# Patient Record
Sex: MALE | Race: WHITE | NOT HISPANIC OR LATINO
[De-identification: names, ages, dates, MRNs, and addresses within clinical notes are randomized per-mention and may not be internally consistent; named-entity substitution may affect disease eponyms.]

---

## 2019-07-16 ENCOUNTER — RECORD ABSTRACTING (OUTPATIENT)
Age: 54
End: 2019-07-16

## 2019-07-16 DIAGNOSIS — Z82.49 FAMILY HISTORY OF ISCHEMIC HEART DISEASE AND OTHER DISEASES OF THE CIRCULATORY SYSTEM: ICD-10-CM

## 2019-07-16 DIAGNOSIS — Z98.890 OTHER SPECIFIED POSTPROCEDURAL STATES: ICD-10-CM

## 2019-07-16 DIAGNOSIS — Z87.440 PERSONAL HISTORY OF URINARY (TRACT) INFECTIONS: ICD-10-CM

## 2019-07-16 DIAGNOSIS — Z87.442 PERSONAL HISTORY OF URINARY CALCULI: ICD-10-CM

## 2019-08-15 ENCOUNTER — APPOINTMENT (OUTPATIENT)
Dept: FAMILY MEDICINE | Facility: CLINIC | Age: 54
End: 2019-08-15
Payer: COMMERCIAL

## 2019-08-15 VITALS
HEART RATE: 67 BPM | SYSTOLIC BLOOD PRESSURE: 140 MMHG | HEIGHT: 68 IN | BODY MASS INDEX: 25.76 KG/M2 | OXYGEN SATURATION: 96 % | DIASTOLIC BLOOD PRESSURE: 100 MMHG | WEIGHT: 170 LBS

## 2019-08-15 DIAGNOSIS — Z77.090 CONTACT WITH AND (SUSPECTED) EXPOSURE TO ASBESTOS: ICD-10-CM

## 2019-08-15 DIAGNOSIS — Z80.9 FAMILY HISTORY OF MALIGNANT NEOPLASM, UNSPECIFIED: ICD-10-CM

## 2019-08-15 DIAGNOSIS — Z82.5 FAMILY HISTORY OF ASTHMA AND OTHER CHRONIC LOWER RESPIRATORY DISEASES: ICD-10-CM

## 2019-08-15 PROCEDURE — 99213 OFFICE O/P EST LOW 20 MIN: CPT | Mod: 25

## 2019-08-15 PROCEDURE — 99396 PREV VISIT EST AGE 40-64: CPT | Mod: 25

## 2019-08-15 PROCEDURE — 36415 COLL VENOUS BLD VENIPUNCTURE: CPT

## 2019-08-15 NOTE — HEALTH RISK ASSESSMENT
[Patient reported colonoscopy was normal] : Patient reported colonoscopy was normal [0] : 2) Feeling down, depressed, or hopeless: Not at all (0) [[id number]] : 0 [ColonoscopyDate] : 04/14 [ColonoscopyComments] : Patient reported

## 2019-08-15 NOTE — HISTORY OF PRESENT ILLNESS
[de-identified] : Pt here for annual PE.\par [place]eds [place]  license.  "".\par Had a cold in the spring.  The skin under the nose was raw.  But refused to heal since then.  Aggravates when he shaves it again.  Doesn't seem to heal.  More visible when dry.\par No exposures to asbestos as of 2009.  Not in air.\par \par

## 2019-08-19 LAB
ALBUMIN SERPL ELPH-MCNC: 4.7 G/DL
ALP BLD-CCNC: 59 U/L
ALT SERPL-CCNC: 16 U/L
ANION GAP SERPL CALC-SCNC: 12 MMOL/L
AST SERPL-CCNC: 15 U/L
BASOPHILS # BLD AUTO: 0.05 K/UL
BASOPHILS NFR BLD AUTO: 0.8 %
BILIRUB SERPL-MCNC: 0.4 MG/DL
BUN SERPL-MCNC: 12 MG/DL
CALCIUM SERPL-MCNC: 9.6 MG/DL
CHLORIDE SERPL-SCNC: 105 MMOL/L
CHOLEST SERPL-MCNC: 250 MG/DL
CHOLEST/HDLC SERPL: 5.1 RATIO
CO2 SERPL-SCNC: 24 MMOL/L
CREAT SERPL-MCNC: 0.81 MG/DL
EOSINOPHIL # BLD AUTO: 0.21 K/UL
EOSINOPHIL NFR BLD AUTO: 3.3 %
ESTIMATED AVERAGE GLUCOSE: 120 MG/DL
GLUCOSE SERPL-MCNC: 115 MG/DL
HBA1C MFR BLD HPLC: 5.8 %
HCT VFR BLD CALC: 48.4 %
HDLC SERPL-MCNC: 49 MG/DL
HGB BLD-MCNC: 15.2 G/DL
IMM GRANULOCYTES NFR BLD AUTO: 1.1 %
LDLC SERPL CALC-MCNC: 176 MG/DL
LYMPHOCYTES # BLD AUTO: 1.56 K/UL
LYMPHOCYTES NFR BLD AUTO: 24.3 %
MAN DIFF?: NORMAL
MCHC RBC-ENTMCNC: 30.1 PG
MCHC RBC-ENTMCNC: 31.4 GM/DL
MCV RBC AUTO: 95.8 FL
MONOCYTES # BLD AUTO: 0.43 K/UL
MONOCYTES NFR BLD AUTO: 6.7 %
NEUTROPHILS # BLD AUTO: 4.09 K/UL
NEUTROPHILS NFR BLD AUTO: 63.8 %
PLATELET # BLD AUTO: 206 K/UL
POTASSIUM SERPL-SCNC: 4.5 MMOL/L
PROT SERPL-MCNC: 7 G/DL
RBC # BLD: 5.05 M/UL
RBC # FLD: 14.1 %
SODIUM SERPL-SCNC: 141 MMOL/L
TRIGL SERPL-MCNC: 124 MG/DL
TSH SERPL-ACNC: 1.28 UIU/ML
WBC # FLD AUTO: 6.41 K/UL

## 2019-09-05 ENCOUNTER — TRANSCRIPTION ENCOUNTER (OUTPATIENT)
Age: 54
End: 2019-09-05

## 2019-10-21 ENCOUNTER — APPOINTMENT (OUTPATIENT)
Dept: FAMILY MEDICINE | Facility: CLINIC | Age: 54
End: 2019-10-21
Payer: COMMERCIAL

## 2019-10-21 VITALS
WEIGHT: 170 LBS | BODY MASS INDEX: 25.76 KG/M2 | HEIGHT: 68 IN | HEART RATE: 72 BPM | DIASTOLIC BLOOD PRESSURE: 88 MMHG | SYSTOLIC BLOOD PRESSURE: 128 MMHG

## 2019-10-21 DIAGNOSIS — Z20.2 CONTACT WITH AND (SUSPECTED) EXPOSURE TO INFECTIONS WITH A PREDOMINANTLY SEXUAL MODE OF TRANSMISSION: ICD-10-CM

## 2019-10-21 PROCEDURE — 99214 OFFICE O/P EST MOD 30 MIN: CPT | Mod: 25

## 2019-10-21 PROCEDURE — G0008: CPT

## 2019-10-21 PROCEDURE — 36415 COLL VENOUS BLD VENIPUNCTURE: CPT

## 2019-10-21 PROCEDURE — 90686 IIV4 VACC NO PRSV 0.5 ML IM: CPT

## 2019-10-22 LAB
HBV CORE IGG+IGM SER QL: NONREACTIVE
HBV SURFACE AB SER QL: NONREACTIVE
HBV SURFACE AG SER QL: NONREACTIVE
HCV AB SER QL: NONREACTIVE
HCV S/CO RATIO: 0.08 S/CO
HEPATITIS A IGG ANTIBODY: NONREACTIVE
T PALLIDUM AB SER QL IA: NEGATIVE

## 2019-10-22 NOTE — HISTORY OF PRESENT ILLNESS
[FreeTextEntry1] : BP follow up [de-identified] : Pt here for f/u.\par Bought BP monitor.  Has been testing at home.  120s/70s on over 20 measurements at home.  Feels well.\par Pt wants to do endoscopy.  Taking nexium.  Gets tremendous discomfort of GI tract when not taking medication.\par Worried about possibly having Hepatitis.  Had unprotected sex many years ago and was never tested.  Also wants to test for syphilis.\par Sees urology for elevated PSA.  Wonders whether we should draw PSA.  No new symptoms or concerns.\par Worried about his heart.  Male in his 50s.  No FH. Few other risk factors.  No classic chest pain but worries if GI symptoms might be cardiac.\par \par

## 2019-12-16 ENCOUNTER — CLINICAL ADVICE (OUTPATIENT)
Age: 54
End: 2019-12-16

## 2019-12-16 ENCOUNTER — TRANSCRIPTION ENCOUNTER (OUTPATIENT)
Age: 54
End: 2019-12-16

## 2020-01-02 ENCOUNTER — NON-APPOINTMENT (OUTPATIENT)
Age: 55
End: 2020-01-02

## 2020-01-02 ENCOUNTER — APPOINTMENT (OUTPATIENT)
Dept: CARDIOLOGY | Facility: CLINIC | Age: 55
End: 2020-01-02
Payer: COMMERCIAL

## 2020-01-02 VITALS
HEIGHT: 68 IN | OXYGEN SATURATION: 96 % | DIASTOLIC BLOOD PRESSURE: 90 MMHG | BODY MASS INDEX: 25.91 KG/M2 | SYSTOLIC BLOOD PRESSURE: 122 MMHG | WEIGHT: 171 LBS | HEART RATE: 76 BPM | TEMPERATURE: 98.4 F

## 2020-01-02 DIAGNOSIS — M94.0 CHONDROCOSTAL JUNCTION SYNDROME [TIETZE]: ICD-10-CM

## 2020-01-02 DIAGNOSIS — Z78.9 OTHER SPECIFIED HEALTH STATUS: ICD-10-CM

## 2020-01-02 PROCEDURE — 0298T: CPT

## 2020-01-02 PROCEDURE — 93000 ELECTROCARDIOGRAM COMPLETE: CPT | Mod: 59

## 2020-01-02 PROCEDURE — 99204 OFFICE O/P NEW MOD 45 MIN: CPT

## 2020-01-02 NOTE — PHYSICAL EXAM
[General Appearance - Well Developed] : well developed [General Appearance - Well Nourished] : well nourished [Normal Conjunctiva] : the conjunctiva exhibited no abnormalities [Heart Rate And Rhythm] : heart rate and rhythm were normal [Heart Sounds] : normal S1 and S2 [Edema] : no peripheral edema present [Auscultation Breath Sounds / Voice Sounds] : lungs were clear to auscultation bilaterally [Bowel Sounds] : normal bowel sounds [Abdomen Soft] : soft [Abdomen Tenderness] : non-tender [Abnormal Walk] : normal gait [Skin Color & Pigmentation] : normal skin color and pigmentation [Nail Clubbing] : no clubbing of the fingernails [Oriented To Time, Place, And Person] : oriented to person, place, and time [FreeTextEntry1] : NCAT

## 2020-01-02 NOTE — REASON FOR VISIT
[Consultation] : a consultation regarding [Chest Pain] : chest pain [Hyperlipidemia] : hyperlipidemia [FreeTextEntry1] : Elevated BP, pre-DM

## 2020-01-02 NOTE — HISTORY OF PRESENT ILLNESS
[FreeTextEntry1] : Patient denies any hx of MI, CHF, or CP syndrome.  No history of CVA.\par \par Over the last couple of weeks (since the holiday season), he's had left chest flutters, about 4-5 times, ,  after alcohol, lasting about 1/2 hr.  The last time was on [place] day, lasting 2 hrs and followed by some chest discomfort, which is still present, though better.\par He usually notices them in bed while in bed.\par \par He has noticed elevated BPs at times -> 130s/90s\par Recently, he checked BPs twice a day for a while, at the request of hi PMD -> mostly 120s/80s\par \par He doesn’t formally exercise, but does house and yard work, w/o CP or sign limitations.  He takes stars regularly.

## 2020-01-16 ENCOUNTER — APPOINTMENT (OUTPATIENT)
Dept: CARDIOLOGY | Facility: CLINIC | Age: 55
End: 2020-01-16
Payer: COMMERCIAL

## 2020-01-16 PROCEDURE — 93306 TTE W/DOPPLER COMPLETE: CPT

## 2020-01-17 ENCOUNTER — APPOINTMENT (OUTPATIENT)
Dept: CARDIOLOGY | Facility: CLINIC | Age: 55
End: 2020-01-17
Payer: COMMERCIAL

## 2020-01-17 DIAGNOSIS — Z92.89 PERSONAL HISTORY OF OTHER MEDICAL TREATMENT: ICD-10-CM

## 2020-01-17 PROCEDURE — 93015 CV STRESS TEST SUPVJ I&R: CPT

## 2020-01-29 DIAGNOSIS — Z92.89 PERSONAL HISTORY OF OTHER MEDICAL TREATMENT: ICD-10-CM

## 2020-01-29 DIAGNOSIS — Z98.890 OTHER SPECIFIED POSTPROCEDURAL STATES: ICD-10-CM

## 2020-01-29 PROCEDURE — 0298T: CPT

## 2020-01-31 ENCOUNTER — APPOINTMENT (OUTPATIENT)
Dept: CARDIOLOGY | Facility: CLINIC | Age: 55
End: 2020-01-31
Payer: COMMERCIAL

## 2020-01-31 VITALS
WEIGHT: 176 LBS | SYSTOLIC BLOOD PRESSURE: 111 MMHG | OXYGEN SATURATION: 97 % | DIASTOLIC BLOOD PRESSURE: 80 MMHG | HEART RATE: 72 BPM | BODY MASS INDEX: 26.76 KG/M2

## 2020-01-31 PROCEDURE — 99215 OFFICE O/P EST HI 40 MIN: CPT

## 2020-01-31 NOTE — REASON FOR VISIT
[Follow-Up - Clinic] : a clinic follow-up of [Chest Pain] : chest pain [Hyperlipidemia] : hyperlipidemia [FreeTextEntry1] : Elevated BP, pre-DM, fluttering sensation

## 2020-01-31 NOTE — HISTORY OF PRESENT ILLNESS
[FreeTextEntry1] : Patient is here for followup \par He says that he had a lot of episodes of palpitations while he carried the monitor. However, he had very few of them after he took it off and no real symptoms over the last couple of weeks\par He was in [place] for 5 days where he had some alcohol and still didn't feel much\par No new complaints\par

## 2020-01-31 NOTE — PHYSICAL EXAM
[General Appearance - Well Developed] : well developed [General Appearance - Well Nourished] : well nourished [Normal Conjunctiva] : the conjunctiva exhibited no abnormalities [FreeTextEntry1] : No JVD or bruits [Auscultation Breath Sounds / Voice Sounds] : lungs were clear to auscultation bilaterally [Heart Rate And Rhythm] : heart rate and rhythm were normal [Heart Sounds] : normal S1 and S2 [Edema] : no peripheral edema present [Bowel Sounds] : normal bowel sounds [Abdomen Soft] : soft [Abdomen Tenderness] : non-tender [Abnormal Walk] : normal gait [Nail Clubbing] : no clubbing of the fingernails [Skin Color & Pigmentation] : normal skin color and pigmentation [Oriented To Time, Place, And Person] : oriented to person, place, and time

## 2020-02-03 ENCOUNTER — TRANSCRIPTION ENCOUNTER (OUTPATIENT)
Age: 55
End: 2020-02-03

## 2020-02-28 ENCOUNTER — APPOINTMENT (OUTPATIENT)
Dept: CARDIOLOGY | Facility: CLINIC | Age: 55
End: 2020-02-28
Payer: COMMERCIAL

## 2020-02-28 VITALS
DIASTOLIC BLOOD PRESSURE: 75 MMHG | WEIGHT: 163 LBS | OXYGEN SATURATION: 97 % | SYSTOLIC BLOOD PRESSURE: 122 MMHG | BODY MASS INDEX: 24.78 KG/M2 | HEART RATE: 70 BPM

## 2020-02-28 PROCEDURE — 99214 OFFICE O/P EST MOD 30 MIN: CPT

## 2020-03-02 NOTE — HISTORY OF PRESENT ILLNESS
[FreeTextEntry1] : He's lost 10 lbs by changing diet since 1/3/20 (no alcohol, caffeine, added salt or sugar)\par He exercises daily on bowflex\par Denies CP, SOB\par \par His BPs have been normal\par \par He stopped the eliquis for 2 days, after an episode of dizziness -.> no more dizziness.  He has since resumed it

## 2020-03-02 NOTE — PHYSICAL EXAM
[General Appearance - Well Developed] : well developed [Normal Conjunctiva] : the conjunctiva exhibited no abnormalities [General Appearance - Well Nourished] : well nourished [Heart Rate And Rhythm] : heart rate and rhythm were normal [Auscultation Breath Sounds / Voice Sounds] : lungs were clear to auscultation bilaterally [Heart Sounds] : normal S1 and S2 [Bowel Sounds] : normal bowel sounds [Edema] : no peripheral edema present [Abdomen Soft] : soft [Abdomen Tenderness] : non-tender [Abnormal Walk] : normal gait [Nail Clubbing] : no clubbing of the fingernails [Oriented To Time, Place, And Person] : oriented to person, place, and time [Skin Color & Pigmentation] : normal skin color and pigmentation [FreeTextEntry1] : No JVD or bruits

## 2020-03-09 ENCOUNTER — APPOINTMENT (OUTPATIENT)
Dept: FAMILY MEDICINE | Facility: CLINIC | Age: 55
End: 2020-03-09
Payer: COMMERCIAL

## 2020-03-09 VITALS
HEIGHT: 68 IN | WEIGHT: 162 LBS | HEART RATE: 72 BPM | BODY MASS INDEX: 24.55 KG/M2 | SYSTOLIC BLOOD PRESSURE: 122 MMHG | DIASTOLIC BLOOD PRESSURE: 78 MMHG

## 2020-03-09 PROCEDURE — 99214 OFFICE O/P EST MOD 30 MIN: CPT | Mod: 25

## 2020-03-09 PROCEDURE — 36415 COLL VENOUS BLD VENIPUNCTURE: CPT

## 2020-03-09 NOTE — HISTORY OF PRESENT ILLNESS
[FreeTextEntry1] : Follow up on multiple diagnoses [de-identified] : Pt here for f/u.\beulah Has been on eliquis for six weeks.\beulah Has cut out all added sugar and salts.  No alcohol or caffeine.  Eating fruits, veggies, salad.  Some fish.\beulah Has lost 10-14 lbs in weight.\par At first on eliquis had dizziness and headaches.  Since then, no side effects except some dizziness.\par No blood in any orifice.  \[facility] Wants to have CBC drawn today.  Also lipids and blood sugar.  Metabolic panel. Fasting since last night.\par Wants to check PSA as well.  Roche method.  \[facility] Has appt set up with Dr. Sky.  EP.  Getting an opinion on heart.\beulah Hasn't had alcohol since January.  \beulah Has woke up at night with heart racing.  Not sure if a fib or tachycardia.\[facility]

## 2020-03-13 ENCOUNTER — TRANSCRIPTION ENCOUNTER (OUTPATIENT)
Age: 55
End: 2020-03-13

## 2020-03-16 LAB
ALBUMIN SERPL ELPH-MCNC: 4.8 G/DL
ALP BLD-CCNC: 62 U/L
ALT SERPL-CCNC: 18 U/L
ANION GAP SERPL CALC-SCNC: 15 MMOL/L
AST SERPL-CCNC: 18 U/L
BASOPHILS # BLD AUTO: 0.03 K/UL
BASOPHILS NFR BLD AUTO: 0.7 %
BILIRUB SERPL-MCNC: 0.5 MG/DL
BUN SERPL-MCNC: 12 MG/DL
CALCIUM SERPL-MCNC: 9.9 MG/DL
CHLORIDE SERPL-SCNC: 102 MMOL/L
CHOLEST SERPL-MCNC: 205 MG/DL
CHOLEST/HDLC SERPL: 5.4 RATIO
CO2 SERPL-SCNC: 24 MMOL/L
CREAT SERPL-MCNC: 0.97 MG/DL
EOSINOPHIL # BLD AUTO: 0.08 K/UL
EOSINOPHIL NFR BLD AUTO: 1.8 %
ESTIMATED AVERAGE GLUCOSE: 120 MG/DL
GLUCOSE SERPL-MCNC: 92 MG/DL
HBA1C MFR BLD HPLC: 5.8 %
HCT VFR BLD CALC: 48.2 %
HDLC SERPL-MCNC: 38 MG/DL
HGB BLD-MCNC: 14.6 G/DL
IMM GRANULOCYTES NFR BLD AUTO: 0.2 %
LDLC SERPL CALC-MCNC: 141 MG/DL
LYMPHOCYTES # BLD AUTO: 1.36 K/UL
LYMPHOCYTES NFR BLD AUTO: 30.7 %
MAN DIFF?: NORMAL
MCHC RBC-ENTMCNC: 29.1 PG
MCHC RBC-ENTMCNC: 30.3 GM/DL
MCV RBC AUTO: 96 FL
MONOCYTES # BLD AUTO: 0.36 K/UL
MONOCYTES NFR BLD AUTO: 8.1 %
NEUTROPHILS # BLD AUTO: 2.59 K/UL
NEUTROPHILS NFR BLD AUTO: 58.5 %
PLATELET # BLD AUTO: 188 K/UL
POTASSIUM SERPL-SCNC: 4.2 MMOL/L
PROT SERPL-MCNC: 6.8 G/DL
PSA FREE FLD-MCNC: 28 %
PSA FREE SERPL-MCNC: 0.74 NG/ML
PSA SERPL-MCNC: 2.65 NG/ML
RBC # BLD: 5.02 M/UL
RBC # FLD: 13.7 %
SODIUM SERPL-SCNC: 141 MMOL/L
TRIGL SERPL-MCNC: 130 MG/DL
TSH SERPL-ACNC: 0.99 UIU/ML
WBC # FLD AUTO: 4.43 K/UL

## 2020-04-03 ENCOUNTER — APPOINTMENT (OUTPATIENT)
Dept: HEART AND VASCULAR | Facility: CLINIC | Age: 55
End: 2020-04-03

## 2020-05-04 ENCOUNTER — TRANSCRIPTION ENCOUNTER (OUTPATIENT)
Age: 55
End: 2020-05-04

## 2020-05-11 ENCOUNTER — APPOINTMENT (OUTPATIENT)
Dept: HEART AND VASCULAR | Facility: CLINIC | Age: 55
End: 2020-05-11
Payer: COMMERCIAL

## 2020-05-11 PROCEDURE — 99204 OFFICE O/P NEW MOD 45 MIN: CPT | Mod: 95

## 2020-05-11 NOTE — HISTORY OF PRESENT ILLNESS
[Home] : at home, [unfilled] , at the time of the visit. [Other Location: e.g. Home (Enter Location, City,State)___] : at [unfilled] [FreeTextEntry1] : Due to the global COVID-19 pandemic and the recommendations for social distancing, this encounter was performed using the SPO audio/video platform.  All components of the evaluation and management were performed per clinical routine with the exception of the in-person physical exam.  If significant physical exam information was provided by the patient or noted by visual inspection on the video portion, it was included in this encounter.  Other available physiologic and diagnostic data were reviewed in detail, (e.g. remote monitoring reports, ambulatory vitals, results of prior testing).  Verbal consent was obtained before proceeding with this encounter.\par \par The video link failed to function during our encounter and we switched to telephone only about 10 minutes in...\par \par 54 y.o male with no significant medical problems who noted increased episodes of rapid heart action around the time of Xmas and New Year's Ирина, in the setting of increased EtOH intake.  He saw a cardiologist who did an initial evaluation that included ECHO, STRESS and Zio patch.  ECHO showed normal LV function and moderately increased LA volume.  STRESS was without significant ischemic changes.  ZIO showed 2% AFib burden with longest episode 1 hour 36 minutes.  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  He was advised to change his dietary and exercise habits.  He has lost 30 pounds and has been avoiding EtOH and caffeine.  He has been feeling great and denies recurrent arrhythmia symptom.\par \par The patient denies any recent chest pain, SOB, WEEMS, palpitation, light headedness, syncope or change in exertional capacity.

## 2020-05-15 ENCOUNTER — APPOINTMENT (OUTPATIENT)
Dept: CARDIOLOGY | Facility: CLINIC | Age: 55
End: 2020-05-15

## 2020-06-22 ENCOUNTER — APPOINTMENT (OUTPATIENT)
Dept: FAMILY MEDICINE | Facility: CLINIC | Age: 55
End: 2020-06-22
Payer: COMMERCIAL

## 2020-06-22 VITALS
SYSTOLIC BLOOD PRESSURE: 104 MMHG | WEIGHT: 145 LBS | HEART RATE: 80 BPM | DIASTOLIC BLOOD PRESSURE: 70 MMHG | BODY MASS INDEX: 21.98 KG/M2 | HEIGHT: 68 IN

## 2020-06-22 PROCEDURE — 36415 COLL VENOUS BLD VENIPUNCTURE: CPT

## 2020-06-22 PROCEDURE — 99214 OFFICE O/P EST MOD 30 MIN: CPT | Mod: 25

## 2020-06-22 RX ORDER — APIXABAN 5 MG/1
5 TABLET, FILM COATED ORAL
Qty: 180 | Refills: 3 | Status: DISCONTINUED | COMMUNITY
Start: 2020-01-31 | End: 2020-06-22

## 2020-06-22 NOTE — HISTORY OF PRESENT ILLNESS
[FreeTextEntry1] : Anxiety, reflux, COVID [de-identified] : Nervous.  Anxiety. Feels it in abd.  Hasn't had this much anxiety in the past as an adult. 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  \par Tired a clonazepam from his .  Helped him calm down.  Also slept better.  Intersted in using clonazepam and/or other meds.\par Difficulty sleeping.\par Acid reflux had gone away.  Due to dietary changes.  Had stopped nexium for four months.  Symptoms have come back in the meantime. Usually when laying flat. Tums and pepcid help.  Uses it only for flare ups.  Never tried zantac.  \par Wants COVID antibody test.\par Followed prostate screening with urology.  Feels OK about that plan.\par In mid Feb was flying back from [place].  Had sick couple on plane in front of them.  Pt's  got sick two days later.  Fever, body ache, cough. runny nose.  Had foot surgery a few days later.  Felt better after surgery.  A week later patient got symptoms.  Started with nasal congestion . Then sore throat.  Fever at night.  Aches/pains at night. Body sweats.  That's when the anxiety started. In late Feb. Even a week before outbreak in [place].  Couldn't gather thoughts and was tired all the time.  Teeth bleeding.  Some bloody scabs in nose.  \par Quit Eliquis.  \par Good exercise. Changed diet (no coffee, alcohol, caffeine).  Lost weight.  Saw EP cardiologist.  Discussed risks/benefits of elequis.  Pt declined.  Could buy CardioMobile.  Has been using it.  Five times a day takes his own EKG.  All have been normal.\par

## 2020-06-25 LAB
ALBUMIN SERPL ELPH-MCNC: 5 G/DL
ALP BLD-CCNC: 65 U/L
ALT SERPL-CCNC: 13 U/L
ANION GAP SERPL CALC-SCNC: 15 MMOL/L
AST SERPL-CCNC: 13 U/L
BASOPHILS # BLD AUTO: 0.02 K/UL
BASOPHILS NFR BLD AUTO: 0.4 %
BILIRUB SERPL-MCNC: 0.9 MG/DL
BUN SERPL-MCNC: 11 MG/DL
CALCIUM SERPL-MCNC: 9.9 MG/DL
CHLORIDE SERPL-SCNC: 101 MMOL/L
CHOLEST SERPL-MCNC: 210 MG/DL
CHOLEST/HDLC SERPL: 4.3 RATIO
CO2 SERPL-SCNC: 26 MMOL/L
CREAT SERPL-MCNC: 0.95 MG/DL
EOSINOPHIL # BLD AUTO: 0.11 K/UL
EOSINOPHIL NFR BLD AUTO: 2 %
ESTIMATED AVERAGE GLUCOSE: 117 MG/DL
GLUCOSE SERPL-MCNC: 97 MG/DL
HBA1C MFR BLD HPLC: 5.7 %
HCT VFR BLD CALC: 50.5 %
HDLC SERPL-MCNC: 49 MG/DL
HGB BLD-MCNC: 15 G/DL
IMM GRANULOCYTES NFR BLD AUTO: 0.4 %
LDLC SERPL CALC-MCNC: 136 MG/DL
LYMPHOCYTES # BLD AUTO: 1.52 K/UL
LYMPHOCYTES NFR BLD AUTO: 27.9 %
MAN DIFF?: NORMAL
MCHC RBC-ENTMCNC: 29 PG
MCHC RBC-ENTMCNC: 29.7 GM/DL
MCV RBC AUTO: 97.7 FL
MONOCYTES # BLD AUTO: 0.47 K/UL
MONOCYTES NFR BLD AUTO: 8.6 %
NEUTROPHILS # BLD AUTO: 3.31 K/UL
NEUTROPHILS NFR BLD AUTO: 60.7 %
PLATELET # BLD AUTO: 215 K/UL
POTASSIUM SERPL-SCNC: 4.5 MMOL/L
PROT SERPL-MCNC: 6.8 G/DL
PSA FREE FLD-MCNC: 29 %
PSA FREE SERPL-MCNC: 0.61 NG/ML
PSA SERPL-MCNC: 2.12 NG/ML
RBC # BLD: 5.17 M/UL
RBC # FLD: 13.9 %
SARS-COV-2 IGG SERPL IA-ACNC: <0.1 INDEX
SARS-COV-2 IGG SERPL QL IA: NEGATIVE
SODIUM SERPL-SCNC: 142 MMOL/L
TRIGL SERPL-MCNC: 126 MG/DL
WBC # FLD AUTO: 5.45 K/UL

## 2020-06-28 ENCOUNTER — TRANSCRIPTION ENCOUNTER (OUTPATIENT)
Age: 55
End: 2020-06-28

## 2020-09-14 ENCOUNTER — APPOINTMENT (OUTPATIENT)
Dept: FAMILY MEDICINE | Facility: CLINIC | Age: 55
End: 2020-09-14
Payer: COMMERCIAL

## 2020-09-14 VITALS
SYSTOLIC BLOOD PRESSURE: 122 MMHG | BODY MASS INDEX: 24.25 KG/M2 | HEIGHT: 68 IN | DIASTOLIC BLOOD PRESSURE: 70 MMHG | WEIGHT: 160 LBS | HEART RATE: 84 BPM

## 2020-09-14 DIAGNOSIS — Z11.59 ENCOUNTER FOR SCREENING FOR OTHER VIRAL DISEASES: ICD-10-CM

## 2020-09-14 DIAGNOSIS — Z85.46 PERSONAL HISTORY OF MALIGNANT NEOPLASM OF PROSTATE: ICD-10-CM

## 2020-09-14 PROCEDURE — G0008: CPT

## 2020-09-14 PROCEDURE — 99214 OFFICE O/P EST MOD 30 MIN: CPT | Mod: 25

## 2020-09-14 PROCEDURE — 90686 IIV4 VACC NO PRSV 0.5 ML IM: CPT

## 2020-09-14 PROCEDURE — 36415 COLL VENOUS BLD VENIPUNCTURE: CPT

## 2020-09-14 NOTE — HISTORY OF PRESENT ILLNESS
[FreeTextEntry1] : Follow up on medical problems [de-identified] : Pt here for PSA.\par Wants blood type.\par Also wants COVID antibodies.\par Needs chol and blood sugar.\par got accommodation from [facility] to work from home.  Needs a note saying he's being seen for conditions that would predispose him to work from home.   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  Look at probability of failure in the future.\par Had cryoablation of prostate in the recent past at [facility].  Mejia about 7 (3 and 4).\par Has used clonazepam prn.  No other meds for anxiety.  Anxiety feels generally well controlled. 's foot problems are giving him some anxiety.  Generally uses it with sleep to help him sleep.  Uses it about 1-2 times a week.\par \par

## 2020-09-21 LAB
ABO + RH PNL BLD: NORMAL
BASOPHILS # BLD AUTO: 0.02 K/UL
BASOPHILS NFR BLD AUTO: 0.4 %
EOSINOPHIL # BLD AUTO: 0.11 K/UL
EOSINOPHIL NFR BLD AUTO: 2 %
ESTIMATED AVERAGE GLUCOSE: 120 MG/DL
HBA1C MFR BLD HPLC: 5.8 %
HCT VFR BLD CALC: 49 %
HGB BLD-MCNC: 15 G/DL
IMM GRANULOCYTES NFR BLD AUTO: 0.4 %
LYMPHOCYTES # BLD AUTO: 1.42 K/UL
LYMPHOCYTES NFR BLD AUTO: 25.6 %
MAN DIFF?: NORMAL
MCHC RBC-ENTMCNC: 29.6 PG
MCHC RBC-ENTMCNC: 30.6 GM/DL
MCV RBC AUTO: 96.8 FL
MONOCYTES # BLD AUTO: 0.4 K/UL
MONOCYTES NFR BLD AUTO: 7.2 %
NEUTROPHILS # BLD AUTO: 3.58 K/UL
NEUTROPHILS NFR BLD AUTO: 64.4 %
PLATELET # BLD AUTO: 211 K/UL
PSA FREE FLD-MCNC: 29 %
PSA FREE SERPL-MCNC: 0.75 NG/ML
PSA SERPL-MCNC: 2.64 NG/ML
RBC # BLD: 5.06 M/UL
RBC # FLD: 14.1 %
SARS-COV-2 IGG SERPL IA-ACNC: <0.1 INDEX
SARS-COV-2 IGG SERPL QL IA: NEGATIVE
WBC # FLD AUTO: 5.55 K/UL

## 2020-09-30 ENCOUNTER — APPOINTMENT (OUTPATIENT)
Dept: FAMILY MEDICINE | Facility: CLINIC | Age: 55
End: 2020-09-30
Payer: COMMERCIAL

## 2020-09-30 VITALS
WEIGHT: 160 LBS | SYSTOLIC BLOOD PRESSURE: 122 MMHG | BODY MASS INDEX: 24.33 KG/M2 | TEMPERATURE: 98.6 F | DIASTOLIC BLOOD PRESSURE: 84 MMHG | OXYGEN SATURATION: 97 % | HEART RATE: 67 BPM

## 2020-09-30 DIAGNOSIS — S76.212A STRAIN OF ADDUCTOR MUSCLE, FASCIA AND TENDON OF LEFT THIGH, INITIAL ENCOUNTER: ICD-10-CM

## 2020-09-30 PROCEDURE — 99212 OFFICE O/P EST SF 10 MIN: CPT

## 2020-09-30 NOTE — HISTORY OF PRESENT ILLNESS
[FreeTextEntry8] : Mr. MANE HYDE is a 55 year old male here today for LLQ abdominal/pelvic pain since Monday. Had a mild gastroenteritis over the weekend but his bowels are back to nml. was alittle constipated before this and took a Dulcolax.  Eating okay now. Did not do any heavy lifting. Had a hernia repair on that side 30 years ago with mesh- was told at one time the "mesh had rolled up"\par

## 2020-09-30 NOTE — PHYSICAL EXAM
[Normal] : no acute distress, well nourished, well developed and well-appearing [Soft] : abdomen soft [No Hernias] : no hernias [de-identified] : Mild tenderness on the LLQ into inguinal area. Not worsened my movement of the leg.

## 2020-12-28 ENCOUNTER — TRANSCRIPTION ENCOUNTER (OUTPATIENT)
Age: 55
End: 2020-12-28

## 2021-07-12 ENCOUNTER — APPOINTMENT (OUTPATIENT)
Dept: FAMILY MEDICINE | Facility: CLINIC | Age: 56
End: 2021-07-12
Payer: COMMERCIAL

## 2021-07-12 VITALS
WEIGHT: 155 LBS | SYSTOLIC BLOOD PRESSURE: 138 MMHG | TEMPERATURE: 97.8 F | HEIGHT: 67 IN | OXYGEN SATURATION: 96 % | DIASTOLIC BLOOD PRESSURE: 73 MMHG | HEART RATE: 66 BPM | BODY MASS INDEX: 24.33 KG/M2

## 2021-07-12 DIAGNOSIS — R03.0 ELEVATED BLOOD-PRESSURE READING, W/OUT DIAGNOSIS OF HYPERTENSION: ICD-10-CM

## 2021-07-12 DIAGNOSIS — Z80.3 FAMILY HISTORY OF MALIGNANT NEOPLASM OF BREAST: ICD-10-CM

## 2021-07-12 DIAGNOSIS — G43.009 MIGRAINE W/OUT AURA, NOT INTRACTABLE, W/OUT STATUS MIGRAINOSUS: ICD-10-CM

## 2021-07-12 PROCEDURE — 99214 OFFICE O/P EST MOD 30 MIN: CPT | Mod: 25

## 2021-07-12 PROCEDURE — 36415 COLL VENOUS BLD VENIPUNCTURE: CPT

## 2021-07-12 PROCEDURE — 99072 ADDL SUPL MATRL&STAF TM PHE: CPT

## 2021-07-12 RX ORDER — ESCITALOPRAM OXALATE 10 MG/1
10 TABLET ORAL DAILY
Qty: 30 | Refills: 1 | Status: DISCONTINUED | COMMUNITY
Start: 2020-06-22 | End: 2021-07-12

## 2021-07-12 NOTE — HISTORY OF PRESENT ILLNESS
[FreeTextEntry1] : Follow up on meds and med problems [de-identified] : Pt here for f/u.\beulah Needs refill on meds--clonazepam.  Takes it when he feels overwhelmed, adrián at night to sleep. Rarely during the day.  Never took lexapro.  The clonazepam was sufficient.\par Stopped blood thinner.  Lost weight.  Regular exercise.\par Wants PSA recheck.  had prostate treatment in the past.  Has sligthly uncomfortable feeling in rectum.  Had had it before.  Tightness, pressure.  May be from stress.  Sold one house.  Moving to another.  Sister with breast cancer.  Mother in poor state of health.  Took some clonazepam and felt better.  H/o int hemorrhoids as well as fissure.  No blood from bottom.  Normal bowel movements. Has well at new home.  Has a reverse osmosis procedure.  The non treated water burns eyes in shower.  May be the variable that has changed.  \par Some ongoing acid reflux but much better than in past.  Off nexium.  Getting more exercise.  Occ Pepcid keeps it under control.\par Interested in seeing colorectal surgeon.  May need a look.\beulah Still uses relpax prn.  But not that he's retired and doesn't look at a screen, the headaches are less frequent.  Needs a refill.\par

## 2021-07-12 NOTE — HEALTH RISK ASSESSMENT
[Yes] : Yes [Never (0 pts)] : Never (0 points) [No falls in past year] : Patient reported no falls in the past year [0] : 2) Feeling down, depressed, or hopeless: Not at all (0) [] : No

## 2021-07-21 LAB
ALBUMIN SERPL ELPH-MCNC: 4.8 G/DL
ALP BLD-CCNC: 59 U/L
ALT SERPL-CCNC: 22 U/L
ANION GAP SERPL CALC-SCNC: 15 MMOL/L
AST SERPL-CCNC: 20 U/L
BASOPHILS # BLD AUTO: 0.02 K/UL
BASOPHILS NFR BLD AUTO: 0.4 %
BILIRUB SERPL-MCNC: 0.6 MG/DL
BUN SERPL-MCNC: 14 MG/DL
CALCIUM SERPL-MCNC: 9.4 MG/DL
CHLORIDE SERPL-SCNC: 103 MMOL/L
CHOLEST SERPL-MCNC: 229 MG/DL
CO2 SERPL-SCNC: 23 MMOL/L
CREAT SERPL-MCNC: 0.89 MG/DL
EOSINOPHIL # BLD AUTO: 0.12 K/UL
EOSINOPHIL NFR BLD AUTO: 2.4 %
ESTIMATED AVERAGE GLUCOSE: 114 MG/DL
GLUCOSE SERPL-MCNC: 94 MG/DL
HBA1C MFR BLD HPLC: 5.6 %
HCT VFR BLD CALC: 46.8 %
HDLC SERPL-MCNC: 60 MG/DL
HGB BLD-MCNC: 14.4 G/DL
IMM GRANULOCYTES NFR BLD AUTO: 0.2 %
LDLC SERPL CALC-MCNC: 153 MG/DL
LYMPHOCYTES # BLD AUTO: 1.45 K/UL
LYMPHOCYTES NFR BLD AUTO: 29.6 %
MAN DIFF?: NORMAL
MCHC RBC-ENTMCNC: 29.8 PG
MCHC RBC-ENTMCNC: 30.8 GM/DL
MCV RBC AUTO: 96.9 FL
MONOCYTES # BLD AUTO: 0.46 K/UL
MONOCYTES NFR BLD AUTO: 9.4 %
NEUTROPHILS # BLD AUTO: 2.84 K/UL
NEUTROPHILS NFR BLD AUTO: 58 %
NONHDLC SERPL-MCNC: 169 MG/DL
PLATELET # BLD AUTO: 198 K/UL
POTASSIUM SERPL-SCNC: 4.4 MMOL/L
PROT SERPL-MCNC: 6.9 G/DL
PSA SERPL-MCNC: 2.56 NG/ML
RBC # BLD: 4.83 M/UL
RBC # FLD: 14.6 %
SODIUM SERPL-SCNC: 141 MMOL/L
TRIGL SERPL-MCNC: 83 MG/DL
WBC # FLD AUTO: 4.9 K/UL

## 2021-07-23 ENCOUNTER — TRANSCRIPTION ENCOUNTER (OUTPATIENT)
Age: 56
End: 2021-07-23

## 2021-07-25 ENCOUNTER — TRANSCRIPTION ENCOUNTER (OUTPATIENT)
Age: 56
End: 2021-07-25

## 2021-08-09 ENCOUNTER — RX RENEWAL (OUTPATIENT)
Age: 56
End: 2021-08-09

## 2021-10-04 ENCOUNTER — APPOINTMENT (OUTPATIENT)
Dept: FAMILY MEDICINE | Facility: CLINIC | Age: 56
End: 2021-10-04
Payer: COMMERCIAL

## 2021-10-04 VITALS
HEART RATE: 70 BPM | DIASTOLIC BLOOD PRESSURE: 82 MMHG | OXYGEN SATURATION: 96 % | TEMPERATURE: 96.7 F | SYSTOLIC BLOOD PRESSURE: 138 MMHG | WEIGHT: 156 LBS | HEIGHT: 67 IN | BODY MASS INDEX: 24.48 KG/M2

## 2021-10-04 PROCEDURE — 99214 OFFICE O/P EST MOD 30 MIN: CPT | Mod: 25

## 2021-10-04 PROCEDURE — 36415 COLL VENOUS BLD VENIPUNCTURE: CPT

## 2021-10-04 NOTE — ASSESSMENT
[FreeTextEntry1] : We discuss script for Tamiflu to have for upcoming sailing expedition.  We decided for now he should plan to see urgent care if he get respiratory symptoms due to high levels of COVID.\par We discuss empirically treating a UTI from sex.  I'd generally prefer to get a urine culture but he can call to discuss if he gets symptoms.

## 2021-10-04 NOTE — HISTORY OF PRESENT ILLNESS
[FreeTextEntry1] : Follow up [de-identified] : Pt here for f/u.\par c/o rectal pressure intermittently.  Attributes it to water in the house (well water).  Has high iron content.  Not pain.  Used to have anal fissure in past.  Pressure sensation.  Comes and goes.  Just when standing or sitting.  Had it earlier in the summer about 3 months ago.  Then went away for weeks.  Gets it  late in afternoon.  Sitting a lot might trigger it--long car ride. Regular stools each morning.  No blood or mucus.\par [place]  Refill on Clonazepam.  Interested in Relenza.\par Aware of normalized hgba1c for first time.\par Wants to f/u on lipids.\par Interested in following PSA as usual.\par Just got flu and COVID booster shot.\par Worries about possible UTIs from topping.  None now.  Wants to call if he gets one.\par Worried about getting flu on boat.  Interest in getting script for Tamiflu to take with him.\par

## 2021-10-06 LAB
ALBUMIN SERPL ELPH-MCNC: 4.9 G/DL
ALP BLD-CCNC: 58 U/L
ALT SERPL-CCNC: 20 U/L
ANION GAP SERPL CALC-SCNC: 13 MMOL/L
AST SERPL-CCNC: 18 U/L
BASOPHILS # BLD AUTO: 0.02 K/UL
BASOPHILS NFR BLD AUTO: 0.4 %
BILIRUB SERPL-MCNC: 0.6 MG/DL
BUN SERPL-MCNC: 14 MG/DL
CALCIUM SERPL-MCNC: 9.7 MG/DL
CHLORIDE SERPL-SCNC: 105 MMOL/L
CHOLEST SERPL-MCNC: 252 MG/DL
CO2 SERPL-SCNC: 23 MMOL/L
CREAT SERPL-MCNC: 0.79 MG/DL
EOSINOPHIL # BLD AUTO: 0.16 K/UL
EOSINOPHIL NFR BLD AUTO: 3.4 %
ESTIMATED AVERAGE GLUCOSE: 120 MG/DL
FERRITIN SERPL-MCNC: 150 NG/ML
GLUCOSE SERPL-MCNC: 97 MG/DL
HBA1C MFR BLD HPLC: 5.8 %
HCT VFR BLD CALC: 46.1 %
HDLC SERPL-MCNC: 55 MG/DL
HGB BLD-MCNC: 14.4 G/DL
IMM GRANULOCYTES NFR BLD AUTO: 0.4 %
IRON SATN MFR SERPL: 51 %
IRON SERPL-MCNC: 173 UG/DL
LDLC SERPL CALC-MCNC: 178 MG/DL
LYMPHOCYTES # BLD AUTO: 1.5 K/UL
LYMPHOCYTES NFR BLD AUTO: 31.6 %
MAN DIFF?: NORMAL
MCHC RBC-ENTMCNC: 29.2 PG
MCHC RBC-ENTMCNC: 31.2 GM/DL
MCV RBC AUTO: 93.5 FL
MONOCYTES # BLD AUTO: 0.42 K/UL
MONOCYTES NFR BLD AUTO: 8.9 %
NEUTROPHILS # BLD AUTO: 2.62 K/UL
NEUTROPHILS NFR BLD AUTO: 55.3 %
NONHDLC SERPL-MCNC: 198 MG/DL
PLATELET # BLD AUTO: 201 K/UL
POTASSIUM SERPL-SCNC: 4.2 MMOL/L
PROT SERPL-MCNC: 7 G/DL
PSA FREE FLD-MCNC: 29 %
PSA FREE SERPL-MCNC: 0.81 NG/ML
PSA SERPL-MCNC: 2.74 NG/ML
RBC # BLD: 4.93 M/UL
RBC # FLD: 13.9 %
SODIUM SERPL-SCNC: 141 MMOL/L
TIBC SERPL-MCNC: 339 UG/DL
TRIGL SERPL-MCNC: 96 MG/DL
UIBC SERPL-MCNC: 166 UG/DL
WBC # FLD AUTO: 4.74 K/UL

## 2022-01-03 ENCOUNTER — APPOINTMENT (OUTPATIENT)
Dept: FAMILY MEDICINE | Facility: CLINIC | Age: 57
End: 2022-01-03
Payer: COMMERCIAL

## 2022-01-03 VITALS
HEART RATE: 66 BPM | DIASTOLIC BLOOD PRESSURE: 92 MMHG | SYSTOLIC BLOOD PRESSURE: 130 MMHG | WEIGHT: 170 LBS | HEIGHT: 67 IN | BODY MASS INDEX: 26.68 KG/M2 | OXYGEN SATURATION: 97 %

## 2022-01-03 PROCEDURE — 36415 COLL VENOUS BLD VENIPUNCTURE: CPT

## 2022-01-03 PROCEDURE — 99212 OFFICE O/P EST SF 10 MIN: CPT | Mod: 25

## 2022-01-03 NOTE — HISTORY OF PRESENT ILLNESS
[FreeTextEntry1] : F/U blood work [de-identified] : Mr. MANE HYDE is a 56 year old male here today for repeat iron, PSA and lipids and Hgba1c\par Found to have slightly elevated iron due to a new water system\par Hx of some abnormal bx results. Being followed. PSA had been stable for some time but bumped up a few months ago,\par Lipids have been steadily increasing. Trying to watch his diet.\par HgbA1c has increased as well. \par

## 2022-01-03 NOTE — HEALTH RISK ASSESSMENT
[Never] : Never [Yes] : Yes [2 - 4 times a month (2 pts)] : 2-4 times a month (2 points) [1 or 2 (0 pts)] : 1 or 2 (0 points) [Never (0 pts)] : Never (0 points) [No] : In the past 12 months have you used drugs other than those required for medical reasons? No [No falls in past year] : Patient reported no falls in the past year [0] : 2) Feeling down, depressed, or hopeless: Not at all (0) [PHQ-2 Negative - No further assessment needed] : PHQ-2 Negative - No further assessment needed [de-identified] : daily [de-identified] : normal [[id number]] : 0

## 2022-01-04 ENCOUNTER — APPOINTMENT (OUTPATIENT)
Dept: SURGERY | Facility: CLINIC | Age: 57
End: 2022-01-04
Payer: COMMERCIAL

## 2022-01-04 VITALS
HEART RATE: 63 BPM | HEIGHT: 67 IN | BODY MASS INDEX: 26.53 KG/M2 | RESPIRATION RATE: 18 BRPM | OXYGEN SATURATION: 96 % | WEIGHT: 169 LBS | DIASTOLIC BLOOD PRESSURE: 89 MMHG | SYSTOLIC BLOOD PRESSURE: 141 MMHG

## 2022-01-04 DIAGNOSIS — R19.8 OTHER SPECIFIED SYMPTOMS AND SIGNS INVOLVING THE DIGESTIVE SYSTEM AND ABDOMEN: ICD-10-CM

## 2022-01-04 LAB
CHOLEST SERPL-MCNC: 242 MG/DL
ESTIMATED AVERAGE GLUCOSE: 120 MG/DL
FERRITIN SERPL-MCNC: 158 NG/ML
HBA1C MFR BLD HPLC: 5.8 %
HDLC SERPL-MCNC: 48 MG/DL
IRON SATN MFR SERPL: 44 %
IRON SERPL-MCNC: 141 UG/DL
LDLC SERPL CALC-MCNC: 174 MG/DL
NONHDLC SERPL-MCNC: 194 MG/DL
PSA FREE FLD-MCNC: 28 %
PSA FREE SERPL-MCNC: 0.87 NG/ML
PSA SERPL-MCNC: 3.14 NG/ML
TIBC SERPL-MCNC: 321 UG/DL
TRIGL SERPL-MCNC: 97 MG/DL
UIBC SERPL-MCNC: 179 UG/DL

## 2022-01-04 PROCEDURE — 46600 DIAGNOSTIC ANOSCOPY SPX: CPT

## 2022-01-04 PROCEDURE — 99203 OFFICE O/P NEW LOW 30 MIN: CPT | Mod: 25

## 2022-01-04 NOTE — PHYSICAL EXAM
[Abdomen Masses] : No abdominal masses [Abdomen Tenderness] : ~T No ~M abdominal tenderness [Normal rectal exam] : exam was normal [Nonprolapsing] : a nonprolapsing (grade I) [Normal] : was normal [None] : there was no rectal abscess [JVD] : no jugular venous distention  [Respiratory Effort] : normal respiratory effort [No Rash or Lesion] : No rash or lesion [Alert] : alert [Calm] : calm [de-identified] : Small skin tag right posterior [de-identified] : No acute distress

## 2022-01-04 NOTE — PROCEDURE
[FreeTextEntry1] : Anoscopy: Anoscopic exam performed with patient in prone jackknife position normal distal mucosa the anal canal and rectum.  Mild to moderate hemorrhoids noted in all 3 columns without any active bleeding.

## 2022-01-04 NOTE — HISTORY OF PRESENT ILLNESS
[FreeTextEntry1] : 56-year-old male here for initial evaluation for rectal pressure.  Patient has a prior history of thrombosed external hemorrhoid that was treated 8 or 9 years ago.  He had a colonoscopy 8 years ago that was normal at [place].  He was told to have a follow-up in 7 to 10 years.  Patient has very rare bleeding with bowel movements when he gets constipation.  This bleeding resolves on its own usually by the next day.  Has not had any other procedures for hemorrhoids in the past.  He does not note significant lumps on the outside of his anal opening.  Denies significant constipation or straining he usually has relatively loose bowel movements.

## 2022-01-04 NOTE — ASSESSMENT
[FreeTextEntry1] : 56-year-old male with complaints of rectal pressure.  Patient has rare bleeding with constipation that is self-limited.  No clear signs of the cause of his rectal pressure at this time on exam although the patient noted that the symptoms have currently resolved today.  Patient notes symptoms are worse when he is sitting and these may be related to internal hemorrhoids.\par \par Patient is due for colonoscopy soon so he would like to proceed with this to rule out any other more proximal sources for his symptoms.  Discussed that it is unlikely he has a colon or rectal cancer at this time given no bleeding symptoms but I agree that it is reasonable to proceed with a colonoscopy given the timing.\par \par We will likely schedule patient for colonoscopy in February or March depending on the progress of the Covid pandemic.  He will get Covid testing prior to procedure.

## 2022-01-24 ENCOUNTER — APPOINTMENT (OUTPATIENT)
Dept: FAMILY MEDICINE | Facility: CLINIC | Age: 57
End: 2022-01-24
Payer: COMMERCIAL

## 2022-01-24 VITALS
BODY MASS INDEX: 26.53 KG/M2 | HEIGHT: 67 IN | RESPIRATION RATE: 16 BRPM | HEART RATE: 66 BPM | DIASTOLIC BLOOD PRESSURE: 84 MMHG | TEMPERATURE: 97.4 F | OXYGEN SATURATION: 97 % | SYSTOLIC BLOOD PRESSURE: 132 MMHG | WEIGHT: 169 LBS

## 2022-01-24 DIAGNOSIS — R07.9 CHEST PAIN, UNSPECIFIED: ICD-10-CM

## 2022-01-24 PROCEDURE — 99214 OFFICE O/P EST MOD 30 MIN: CPT | Mod: 25

## 2022-01-24 PROCEDURE — 93000 ELECTROCARDIOGRAM COMPLETE: CPT

## 2022-01-25 PROBLEM — R07.9 CHEST PAIN: Status: ACTIVE | Noted: 2020-01-02

## 2022-01-25 NOTE — HISTORY OF PRESENT ILLNESS
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 He has a previous hx of A fib and was on Eliquis but it was discontinued but he didn't have A fib in almost 2 years. \par He also reports stopping all caffeine use a week ago. He was drinking 5 cups of coffee daily. Sometimes takes Nexium for acid reflux but this discomfort feels like a dull persistent pressure. No nausea, vomiting, dizziness or excessive sweating. No cough. \par Has a hx of anxiety and tried taking Clonazepam which helped a little.

## 2022-04-04 ENCOUNTER — APPOINTMENT (OUTPATIENT)
Dept: FAMILY MEDICINE | Facility: CLINIC | Age: 57
End: 2022-04-04
Payer: COMMERCIAL

## 2022-04-04 VITALS
HEIGHT: 67 IN | BODY MASS INDEX: 27.62 KG/M2 | WEIGHT: 176 LBS | RESPIRATION RATE: 16 BRPM | TEMPERATURE: 98.1 F | HEART RATE: 75 BPM | DIASTOLIC BLOOD PRESSURE: 90 MMHG | SYSTOLIC BLOOD PRESSURE: 130 MMHG | OXYGEN SATURATION: 97 %

## 2022-04-04 DIAGNOSIS — R00.2 PALPITATIONS: ICD-10-CM

## 2022-04-04 PROCEDURE — 36415 COLL VENOUS BLD VENIPUNCTURE: CPT

## 2022-04-04 PROCEDURE — 99214 OFFICE O/P EST MOD 30 MIN: CPT | Mod: 25

## 2022-04-04 NOTE — HISTORY OF PRESENT ILLNESS
[FreeTextEntry1] : Follow up on symptoms [de-identified] : Pt here for f/u.\par Had been here two months ago for chest pain.  Pain hasn't recurred.  However, he thinks he's been having a-fib episodes since then.  Maybe four times for about 20 minutes each. Feels heart racing.  Slightly different from a-fib that he used to feel that was lower part of his heart and felt irregular.  Had seen Dr. Shrestha and knows he needs to be seen again.  Usually gets sensations when calm and sedentary on couch.  Can go away with movement or repositioning.  Clonazepam helps it go away.  Needs refill on clonazepam.\par Mother  in January of metastatic breast cancer. Lots of stress on patient.  \par Thinks he had COVID about early 2021.  Could have been trigger to the newer symptoms of a-fib.\par Pt saw Dr. Dhaliwal . Had proctoscope . Saw int/ext hemorrhoids.  Didn't think iron was related to it. Blood loss or sensatoin most likely from the hemorrhoids and maybe a fissure.  Recommended colonoscopy in May 2022.\par Aware of slight increase of PSA relative to previous results.  No symptoms. Feels OK . But worried about it.\par Weight has gone up.  Less exercise recently.  Needs to retest hgb1ac.  \par  PROCEDURES:  Circumcision,  2022 12:47:18  Balbina Lott

## 2022-04-05 LAB
ESTIMATED AVERAGE GLUCOSE: 120 MG/DL
HBA1C MFR BLD HPLC: 5.8 %
PSA SERPL-MCNC: 3.13 NG/ML

## 2022-05-11 ENCOUNTER — NON-APPOINTMENT (OUTPATIENT)
Age: 57
End: 2022-05-11

## 2022-05-11 ENCOUNTER — APPOINTMENT (OUTPATIENT)
Dept: HEART AND VASCULAR | Facility: CLINIC | Age: 57
End: 2022-05-11
Payer: COMMERCIAL

## 2022-05-11 VITALS
WEIGHT: 175 LBS | SYSTOLIC BLOOD PRESSURE: 126 MMHG | HEART RATE: 66 BPM | RESPIRATION RATE: 16 BRPM | TEMPERATURE: 97 F | HEIGHT: 67 IN | DIASTOLIC BLOOD PRESSURE: 82 MMHG | BODY MASS INDEX: 27.47 KG/M2 | OXYGEN SATURATION: 97 %

## 2022-05-11 PROCEDURE — 93000 ELECTROCARDIOGRAM COMPLETE: CPT

## 2022-05-11 PROCEDURE — 99214 OFFICE O/P EST MOD 30 MIN: CPT

## 2022-05-11 NOTE — REASON FOR VISIT
[FreeTextEntry1] : 56 y.o. male with a h/o PAF starting in late 2019.  He was started on Eliquis at that time but had issues with bleeding.  He did major lifestyle changes with 30 pound weight loss and reduction of stress, increase in exercise and symptoms / episodes improved significantly.  Eliquis was stopped.  Of late, he has noted that he is having more frequent episodes, documented with the Kardia device.  He is not sure why but is concerned that the arrhythmia may have been aggravated but increased use of Nexium and Flonase.  He stopped these medications two weeks ago and the racing heart rhythm has improved.  He returns for repeat evaluation as he has not been seen for a while.\par \par The patient denies chest pain, SOB, WEEMS, light headedness, syncope or change in exertional capacity.\par

## 2022-05-12 ENCOUNTER — APPOINTMENT (OUTPATIENT)
Dept: HEART AND VASCULAR | Facility: CLINIC | Age: 57
End: 2022-05-12
Payer: COMMERCIAL

## 2022-05-12 PROCEDURE — 93306 TTE W/DOPPLER COMPLETE: CPT

## 2022-05-13 ENCOUNTER — NON-APPOINTMENT (OUTPATIENT)
Age: 57
End: 2022-05-13

## 2022-05-27 ENCOUNTER — APPOINTMENT (OUTPATIENT)
Dept: HEART AND VASCULAR | Facility: CLINIC | Age: 57
End: 2022-05-27
Payer: COMMERCIAL

## 2022-05-27 ENCOUNTER — APPOINTMENT (OUTPATIENT)
Dept: HEART AND VASCULAR | Facility: CLINIC | Age: 57
End: 2022-05-27

## 2022-05-27 ENCOUNTER — NON-APPOINTMENT (OUTPATIENT)
Age: 57
End: 2022-05-27

## 2022-05-27 VITALS
DIASTOLIC BLOOD PRESSURE: 90 MMHG | BODY MASS INDEX: 26.68 KG/M2 | OXYGEN SATURATION: 97 % | HEIGHT: 67 IN | SYSTOLIC BLOOD PRESSURE: 120 MMHG | WEIGHT: 170 LBS | HEART RATE: 71 BPM

## 2022-05-27 PROCEDURE — 99214 OFFICE O/P EST MOD 30 MIN: CPT | Mod: 25

## 2022-05-27 PROCEDURE — 93000 ELECTROCARDIOGRAM COMPLETE: CPT

## 2022-05-30 NOTE — CARDIOLOGY SUMMARY
[de-identified] : NSR at 66 bpm, normal axis/intervals [de-identified] : EF 60%, severely dilated LA, mild MR, mildly enlarged RA

## 2022-05-30 NOTE — DISCUSSION/SUMMARY
[FreeTextEntry1] : Mr. Mancuso is a 56 year-old male with recurrent palpitations that correspond to paroxysmal atrial fibrillation.\par \par 1. Rhythm management \par -start diltiazem 120mg daily for rate control (given depression history, avoid beta blockers)\par -given young age and desire to avoid av prachi agents, plan for ablation in near future.  Discussed risks of procedure, which included but are not limited to bleeding, infection, vascular damage, tamponade, phrenic nerve injury, esophageal injury, and stroke.  He would like to proceed with ablation\par \par 2.  Stroke prevention - Tolerating Eliquis 5mg twice daily.  Aware that he will need to take it prior to and for 2-3 months post ablation.  \par \par 3.  To be scheduled for ablation with Dr. Shrestha in the coming weeks.  \par

## 2022-05-30 NOTE — HISTORY OF PRESENT ILLNESS
[FreeTextEntry1] : Mr. Mancuso is a 56 year-old. male with a h/o PAF starting in late 2019. He was started on Eliquis at that time but had issues with bleeding. He did major lifestyle changes with 30 pound weight loss and reduction of stress, increase in exercise and symptoms / episodes improved significantly. Eliquis was stopped. Of late, he has noted that he is having more frequent episodes, documented with the Kardia device. He is not sure why but is concerned that the arrhythmia may have been aggravated but increased use of Nexium and Flonase. He stopped these medication but his episodes have continued. He returns for repeat evaluation and wishes to proceed with ablation.  He has restarted his Eliquis and has not had any bleeding issues as of late.

## 2022-05-31 ENCOUNTER — APPOINTMENT (OUTPATIENT)
Dept: HEART AND VASCULAR | Facility: CLINIC | Age: 57
End: 2022-05-31

## 2022-05-31 ENCOUNTER — TRANSCRIPTION ENCOUNTER (OUTPATIENT)
Age: 57
End: 2022-05-31

## 2022-06-06 ENCOUNTER — APPOINTMENT (OUTPATIENT)
Dept: FAMILY MEDICINE | Facility: CLINIC | Age: 57
End: 2022-06-06

## 2022-06-17 ENCOUNTER — OUTPATIENT (OUTPATIENT)
Dept: OUTPATIENT SERVICES | Facility: HOSPITAL | Age: 57
LOS: 1 days | End: 2022-06-17
Payer: COMMERCIAL

## 2022-06-17 ENCOUNTER — APPOINTMENT (OUTPATIENT)
Dept: CT IMAGING | Facility: HOSPITAL | Age: 57
End: 2022-06-17
Payer: COMMERCIAL

## 2022-06-17 PROCEDURE — 75572 CT HRT W/3D IMAGE: CPT | Mod: 26

## 2022-06-17 PROCEDURE — 82565 ASSAY OF CREATININE: CPT

## 2022-06-17 PROCEDURE — 75572 CT HRT W/3D IMAGE: CPT

## 2022-06-20 ENCOUNTER — INPATIENT (INPATIENT)
Facility: HOSPITAL | Age: 57
LOS: 0 days | Discharge: HOME | DRG: 274 | End: 2022-06-20
Attending: INTERNAL MEDICINE | Admitting: INTERNAL MEDICINE
Payer: COMMERCIAL

## 2022-06-20 VITALS
SYSTOLIC BLOOD PRESSURE: 148 MMHG | DIASTOLIC BLOOD PRESSURE: 82 MMHG | TEMPERATURE: 98 F | HEART RATE: 76 BPM | RESPIRATION RATE: 16 BRPM | OXYGEN SATURATION: 99 %

## 2022-06-20 DIAGNOSIS — Z98.890 OTHER SPECIFIED POSTPROCEDURAL STATES: Chronic | ICD-10-CM

## 2022-06-20 DIAGNOSIS — Z90.89 ACQUIRED ABSENCE OF OTHER ORGANS: Chronic | ICD-10-CM

## 2022-06-20 LAB
ANION GAP SERPL CALC-SCNC: 10 MMOL/L — SIGNIFICANT CHANGE UP (ref 5–17)
APTT BLD: 32.5 SEC — SIGNIFICANT CHANGE UP (ref 27.5–35.5)
BLD GP AB SCN SERPL QL: NEGATIVE — SIGNIFICANT CHANGE UP
BUN SERPL-MCNC: 13 MG/DL — SIGNIFICANT CHANGE UP (ref 7–23)
CALCIUM SERPL-MCNC: 9.4 MG/DL — SIGNIFICANT CHANGE UP (ref 8.4–10.5)
CHLORIDE SERPL-SCNC: 105 MMOL/L — SIGNIFICANT CHANGE UP (ref 96–108)
CO2 SERPL-SCNC: 25 MMOL/L — SIGNIFICANT CHANGE UP (ref 22–31)
CREAT SERPL-MCNC: 0.83 MG/DL — SIGNIFICANT CHANGE UP (ref 0.5–1.3)
EGFR: 101 ML/MIN/1.73M2 — SIGNIFICANT CHANGE UP
GLUCOSE SERPL-MCNC: 117 MG/DL — HIGH (ref 70–99)
HCT VFR BLD CALC: 44.9 % — SIGNIFICANT CHANGE UP (ref 39–50)
HGB BLD-MCNC: 14.8 G/DL — SIGNIFICANT CHANGE UP (ref 13–17)
INR BLD: 1.25 — HIGH (ref 0.88–1.16)
ISTAT INR: 1.1 — SIGNIFICANT CHANGE UP (ref 0.88–1.16)
ISTAT PT: 13.3 SEC — HIGH (ref 10–12.9)
ISTAT VENOUS BE: 1 MMOL/L — SIGNIFICANT CHANGE UP (ref -2–3)
ISTAT VENOUS GLUCOSE: 110 MG/DL — HIGH (ref 70–99)
ISTAT VENOUS HCO3: 26 MMOL/L — SIGNIFICANT CHANGE UP (ref 23–28)
ISTAT VENOUS HEMATOCRIT: 44 % — SIGNIFICANT CHANGE UP (ref 39–50)
ISTAT VENOUS HEMOGLOBIN: 15 GM/DL — SIGNIFICANT CHANGE UP (ref 13–17)
ISTAT VENOUS IONIZED CALCIUM: 1.25 MMOL/L — SIGNIFICANT CHANGE UP (ref 1.12–1.3)
ISTAT VENOUS PCO2: 42 MMHG — SIGNIFICANT CHANGE UP (ref 41–51)
ISTAT VENOUS PH: 7.39 — SIGNIFICANT CHANGE UP (ref 7.31–7.41)
ISTAT VENOUS PO2: <66 MMHG — HIGH (ref 35–40)
ISTAT VENOUS POTASSIUM: 4.2 MMOL/L — SIGNIFICANT CHANGE UP (ref 3.5–5.3)
ISTAT VENOUS SO2: 87 % — SIGNIFICANT CHANGE UP
ISTAT VENOUS SODIUM: 140 MMOL/L — SIGNIFICANT CHANGE UP (ref 135–145)
ISTAT VENOUS TCO2: 27 MMOL/L — SIGNIFICANT CHANGE UP (ref 22–31)
MCHC RBC-ENTMCNC: 30.1 PG — SIGNIFICANT CHANGE UP (ref 27–34)
MCHC RBC-ENTMCNC: 33 GM/DL — SIGNIFICANT CHANGE UP (ref 32–36)
MCV RBC AUTO: 91.4 FL — SIGNIFICANT CHANGE UP (ref 80–100)
NRBC # BLD: 0 /100 WBCS — SIGNIFICANT CHANGE UP (ref 0–0)
PLATELET # BLD AUTO: 188 K/UL — SIGNIFICANT CHANGE UP (ref 150–400)
POCT ISTAT CREATININE: 0.8 MG/DL — SIGNIFICANT CHANGE UP (ref 0.5–1.3)
POTASSIUM SERPL-MCNC: 4.2 MMOL/L — SIGNIFICANT CHANGE UP (ref 3.5–5.3)
POTASSIUM SERPL-SCNC: 4.2 MMOL/L — SIGNIFICANT CHANGE UP (ref 3.5–5.3)
PROTHROM AB SERPL-ACNC: 14.9 SEC — HIGH (ref 10.5–13.4)
RBC # BLD: 4.91 M/UL — SIGNIFICANT CHANGE UP (ref 4.2–5.8)
RBC # FLD: 13.1 % — SIGNIFICANT CHANGE UP (ref 10.3–14.5)
RH IG SCN BLD-IMP: POSITIVE — SIGNIFICANT CHANGE UP
SODIUM SERPL-SCNC: 140 MMOL/L — SIGNIFICANT CHANGE UP (ref 135–145)
WBC # BLD: 5.15 K/UL — SIGNIFICANT CHANGE UP (ref 3.8–10.5)
WBC # FLD AUTO: 5.15 K/UL — SIGNIFICANT CHANGE UP (ref 3.8–10.5)

## 2022-06-20 PROCEDURE — 85014 HEMATOCRIT: CPT

## 2022-06-20 PROCEDURE — 85027 COMPLETE CBC AUTOMATED: CPT

## 2022-06-20 PROCEDURE — C1769: CPT

## 2022-06-20 PROCEDURE — C1894: CPT

## 2022-06-20 PROCEDURE — 86900 BLOOD TYPING SEROLOGIC ABO: CPT

## 2022-06-20 PROCEDURE — C1730: CPT

## 2022-06-20 PROCEDURE — 93656 COMPRE EP EVAL ABLTJ ATR FIB: CPT

## 2022-06-20 PROCEDURE — 82947 ASSAY GLUCOSE BLOOD QUANT: CPT

## 2022-06-20 PROCEDURE — 82565 ASSAY OF CREATININE: CPT

## 2022-06-20 PROCEDURE — 82803 BLOOD GASES ANY COMBINATION: CPT

## 2022-06-20 PROCEDURE — C1760: CPT

## 2022-06-20 PROCEDURE — 85347 COAGULATION TIME ACTIVATED: CPT

## 2022-06-20 PROCEDURE — 86850 RBC ANTIBODY SCREEN: CPT

## 2022-06-20 PROCEDURE — C1733: CPT

## 2022-06-20 PROCEDURE — C1759: CPT

## 2022-06-20 PROCEDURE — 85730 THROMBOPLASTIN TIME PARTIAL: CPT

## 2022-06-20 PROCEDURE — 86901 BLOOD TYPING SEROLOGIC RH(D): CPT

## 2022-06-20 PROCEDURE — 84295 ASSAY OF SERUM SODIUM: CPT

## 2022-06-20 PROCEDURE — C1893: CPT

## 2022-06-20 PROCEDURE — C1766: CPT

## 2022-06-20 PROCEDURE — 36415 COLL VENOUS BLD VENIPUNCTURE: CPT

## 2022-06-20 PROCEDURE — 85610 PROTHROMBIN TIME: CPT

## 2022-06-20 PROCEDURE — 82330 ASSAY OF CALCIUM: CPT

## 2022-06-20 PROCEDURE — 84132 ASSAY OF SERUM POTASSIUM: CPT

## 2022-06-20 PROCEDURE — 80048 BASIC METABOLIC PNL TOTAL CA: CPT

## 2022-06-20 RX ORDER — DILTIAZEM HCL 120 MG
120 CAPSULE, EXT RELEASE 24 HR ORAL DAILY
Refills: 0 | Status: DISCONTINUED | OUTPATIENT
Start: 2022-06-20 | End: 2022-06-20

## 2022-06-20 RX ORDER — APIXABAN 2.5 MG/1
5 TABLET, FILM COATED ORAL
Refills: 0 | Status: DISCONTINUED | OUTPATIENT
Start: 2022-06-20 | End: 2022-06-20

## 2022-06-20 NOTE — H&P ADULT - NSICDXPASTSURGICALHX_GEN_ALL_CORE_FT
PAST SURGICAL HISTORY:  H/O cervical spine surgery C4-C5    History of prostate surgery     S/P left inguinal hernia repair     S/P tonsillectomy

## 2022-06-20 NOTE — H&P ADULT - ENDOCRINE
Likely inciting source of DKA.  Bld Cx - E.Coli.  Urine contaminated.  Will need infectious disease evaluation for duration of abx.  c/w abx. SECONDARY TO HYPERGLYCEMIA Likely inciting source of DKA.  Source possible from urine.  Awaiting speciation of UCx. Bld Cx - E.Coli.  c/w broad spectrum abx. Resolved negative

## 2022-06-20 NOTE — H&P ADULT - ASSESSMENT
55 y/o M with long paroxysmal AFIB here for AFIB cryoablation.  Echo in May showed LVEF 60%.  AFIB tends to be fast in VR, noted on Kardia.  On uninterrupted eliquis.    - NPO for ablation today.    - To continue eliquis and Diltiazem.   - Bedrest post procedure as per protocol.

## 2022-06-20 NOTE — H&P ADULT - HISTORY OF PRESENT ILLNESS
Mr. Mancuso is a 56 year-old. male with a h/o PAF starting in late 2019. He was started on Eliquis at that time but had issues with bleeding. He did major lifestyle changes with 30 pound weight loss and reduction of stress, increase in exercise and symptoms / episodes improved significantly. Eliquis was stopped. Of late, he has noted that he is having more frequent episodes, documented with the Kardia device. When he was in AFIB, his rate was up to 160-170 bpm. He is not sure why but is concerned that the arrhythmia may have been aggravated but increased use of Nexium and Flonase. He stopped these medication but his episodes have continued. He returned to our office for repeat evaluation and wished to proceed with ablation.  He has restarted his Eliquis for about a month now and has not had any bleeding issues as of late.    Last dose of Eliquis was this morning.  EKG today in NSR.    No h/o syncope or CP.  Stress test in 2019 was reportedly normal.   Echo 5/12/22: LVEF 60%. Severely dilated LA (51cc/m2).

## 2022-06-21 ENCOUNTER — NON-APPOINTMENT (OUTPATIENT)
Age: 57
End: 2022-06-21

## 2022-06-21 LAB — SARS-COV-2 N GENE NPH QL NAA+PROBE: NOT DETECTED

## 2022-06-22 PROBLEM — I48.0 PAROXYSMAL ATRIAL FIBRILLATION: Chronic | Status: ACTIVE | Noted: 2022-06-20

## 2022-06-22 RX ORDER — APIXABAN 5 MG/1
5 TABLET, FILM COATED ORAL
Qty: 180 | Refills: 2 | Status: COMPLETED | COMMUNITY
Start: 2022-05-30 | End: 2022-06-22

## 2022-06-28 DIAGNOSIS — I48.0 PAROXYSMAL ATRIAL FIBRILLATION: ICD-10-CM

## 2022-06-28 DIAGNOSIS — Z79.01 LONG TERM (CURRENT) USE OF ANTICOAGULANTS: ICD-10-CM

## 2022-06-28 DIAGNOSIS — K21.9 GASTRO-ESOPHAGEAL REFLUX DISEASE WITHOUT ESOPHAGITIS: ICD-10-CM

## 2022-06-28 DIAGNOSIS — J45.909 UNSPECIFIED ASTHMA, UNCOMPLICATED: ICD-10-CM

## 2022-06-28 DIAGNOSIS — Z87.442 PERSONAL HISTORY OF URINARY CALCULI: ICD-10-CM

## 2022-06-29 LAB
ISTAT ACTK (ACTIVATED CLOTTING TIME KAOLIN): 283 SEC — HIGH (ref 74–137)
ISTAT ACTK (ACTIVATED CLOTTING TIME KAOLIN): 312 SEC — HIGH (ref 74–137)
ISTAT ACTK (ACTIVATED CLOTTING TIME KAOLIN): 324 SEC — HIGH (ref 74–137)

## 2022-08-02 ENCOUNTER — NON-APPOINTMENT (OUTPATIENT)
Age: 57
End: 2022-08-02

## 2022-08-02 ENCOUNTER — APPOINTMENT (OUTPATIENT)
Dept: HEART AND VASCULAR | Facility: CLINIC | Age: 57
End: 2022-08-02

## 2022-08-02 VITALS
TEMPERATURE: 97.8 F | HEIGHT: 67 IN | SYSTOLIC BLOOD PRESSURE: 131 MMHG | BODY MASS INDEX: 26.53 KG/M2 | WEIGHT: 169 LBS | DIASTOLIC BLOOD PRESSURE: 86 MMHG | HEART RATE: 73 BPM

## 2022-08-02 PROCEDURE — 93000 ELECTROCARDIOGRAM COMPLETE: CPT

## 2022-08-02 PROCEDURE — 99214 OFFICE O/P EST MOD 30 MIN: CPT | Mod: 25

## 2022-08-02 RX ORDER — COLCHICINE 0.6 MG/1
0.6 TABLET ORAL DAILY
Qty: 5 | Refills: 0 | Status: DISCONTINUED | COMMUNITY
Start: 2022-06-22 | End: 2022-08-02

## 2022-08-02 RX ORDER — RIVAROXABAN 20 MG/1
20 TABLET, FILM COATED ORAL
Qty: 90 | Refills: 1 | Status: DISCONTINUED | COMMUNITY
Start: 2022-06-22 | End: 2022-08-02

## 2022-08-02 NOTE — HISTORY OF PRESENT ILLNESS
[FreeTextEntry1] : Mr. Mancuso is a 56 year-old. male with a h/o PAF starting in late 2019. He was started on Eliquis at that time but had issues with bleeding. He did major lifestyle changes with 30 pound weight loss and reduction of stress, increase in exercise and symptoms / episodes improved significantly. Eliquis was stopped. However, he noted increasing episodes since early 2022.  LTM showed 9% Afib burden in May 2022.  He is s/p CRYO PVI 6/20/22.  He hasn't had any episodes of palpitations post procedure.  He notes that his HR has been higher than usual when he exerts himself (up to ~ 110 bpm).

## 2022-08-02 NOTE — CARDIOLOGY SUMMARY
[de-identified] : 8/2/22 SR @ 72 bpm, normal axis / intervals\par NSR at 66 bpm, normal axis/intervals [de-identified] : EF 60%, severely dilated LA, mild MR, mildly enlarged RA

## 2022-08-04 ENCOUNTER — APPOINTMENT (OUTPATIENT)
Dept: FAMILY MEDICINE | Facility: CLINIC | Age: 57
End: 2022-08-04

## 2022-08-04 VITALS
DIASTOLIC BLOOD PRESSURE: 84 MMHG | TEMPERATURE: 97.8 F | HEART RATE: 79 BPM | WEIGHT: 169 LBS | BODY MASS INDEX: 26.53 KG/M2 | SYSTOLIC BLOOD PRESSURE: 126 MMHG | OXYGEN SATURATION: 96 % | HEIGHT: 67 IN

## 2022-08-04 PROCEDURE — 99213 OFFICE O/P EST LOW 20 MIN: CPT

## 2022-08-04 RX ORDER — DILTIAZEM HYDROCHLORIDE 120 MG/1
120 CAPSULE, EXTENDED RELEASE ORAL DAILY
Qty: 30 | Refills: 5 | Status: DISCONTINUED | COMMUNITY
Start: 2022-05-27 | End: 2022-08-04

## 2022-08-04 RX ORDER — DILTIAZEM HYDROCHLORIDE 120 MG/1
120 CAPSULE, EXTENDED RELEASE ORAL DAILY
Qty: 30 | Refills: 0 | Status: DISCONTINUED | COMMUNITY
Start: 2022-05-27 | End: 2022-08-04

## 2022-08-04 NOTE — HISTORY OF PRESENT ILLNESS
[FreeTextEntry8] : Pt is a 55 y/o M that presents to the clinic c/o dry cough for 5 days. Covid test was negative. No fever, cough, or congestion.  Notes some chest tightness. \par No chest pain or shortness of breath. feels well overall.

## 2022-08-08 ENCOUNTER — TRANSCRIPTION ENCOUNTER (OUTPATIENT)
Age: 57
End: 2022-08-08

## 2022-08-15 ENCOUNTER — APPOINTMENT (OUTPATIENT)
Dept: FAMILY MEDICINE | Facility: CLINIC | Age: 57
End: 2022-08-15

## 2022-08-15 VITALS
RESPIRATION RATE: 15 BRPM | WEIGHT: 169 LBS | HEIGHT: 67 IN | HEART RATE: 76 BPM | OXYGEN SATURATION: 98 % | DIASTOLIC BLOOD PRESSURE: 90 MMHG | TEMPERATURE: 98 F | SYSTOLIC BLOOD PRESSURE: 130 MMHG | BODY MASS INDEX: 26.53 KG/M2

## 2022-08-15 DIAGNOSIS — R05.8 OTHER SPECIFIED COUGH: ICD-10-CM

## 2022-08-15 PROCEDURE — 99213 OFFICE O/P EST LOW 20 MIN: CPT

## 2022-08-15 NOTE — PHYSICAL EXAM
[Normal] : no respiratory distress, lungs were clear to auscultation bilaterally and no accessory muscle use [de-identified] : Nasal d/c bilaterally; mucus at back of throat

## 2022-08-15 NOTE — ASSESSMENT
[FreeTextEntry1] : We discussed vaccines.  He will consider testing for hepatitis immunity at his upcoming PE.  Get shots as needed based on results.

## 2022-08-15 NOTE — HISTORY OF PRESENT ILLNESS
[FreeTextEntry1] : Cough [de-identified] : In July had procedure at [facility].  Atrial fibrillation surgery.  Pulm vein was confirmed as the problem.  Cryo balloon.  Went well.  No concerns.  Stopped diltiazem soon after.\beulah Had cough start of August--right after the surgery.  No other symptoms.  Saw Dr. Lockhart.  Normal workup. Put on albuterol.  Loosens up chest but hasn't stopped the cough.  Going on vacation in two weeks. \par Cough worse in AM when he wakes.  Doesn't wake him from sleep.  Happens when he gets out of bed . May have had this in past.  Used benzonatate.  Bryce's friend (menthol).  Clear fluid from nose.  Has been using flonase and zycam. They might help a little.\beulah Feels like his sinuses are producing mucus.  Into his nose and down throat.\beulah

## 2022-10-14 ENCOUNTER — APPOINTMENT (OUTPATIENT)
Dept: HEART AND VASCULAR | Facility: CLINIC | Age: 57
End: 2022-10-14

## 2022-10-14 ENCOUNTER — NON-APPOINTMENT (OUTPATIENT)
Age: 57
End: 2022-10-14

## 2022-10-14 VITALS
HEIGHT: 67 IN | RESPIRATION RATE: 16 BRPM | HEART RATE: 79 BPM | SYSTOLIC BLOOD PRESSURE: 116 MMHG | BODY MASS INDEX: 26.21 KG/M2 | OXYGEN SATURATION: 98 % | TEMPERATURE: 97.1 F | DIASTOLIC BLOOD PRESSURE: 84 MMHG | WEIGHT: 167 LBS

## 2022-10-14 PROCEDURE — 93000 ELECTROCARDIOGRAM COMPLETE: CPT

## 2022-10-14 PROCEDURE — 99213 OFFICE O/P EST LOW 20 MIN: CPT | Mod: 25

## 2022-10-14 RX ORDER — BENZONATATE 200 MG/1
200 CAPSULE ORAL
Qty: 30 | Refills: 1 | Status: DISCONTINUED | COMMUNITY
Start: 2022-08-15 | End: 2022-10-14

## 2022-10-14 NOTE — PHYSICAL EXAM
[Well Developed] : well developed [Well Nourished] : well nourished [No Acute Distress] : no acute distress [Normal Conjunctiva] : normal conjunctiva [Normal Venous Pressure] : normal venous pressure [No Carotid Bruit] : no carotid bruit [Normal S1, S2] : normal S1, S2 [No Murmur] : no murmur [No Rub] : no rub [No Gallop] : no gallop [Clear Lung Fields] : clear lung fields [Good Air Entry] : good air entry [No Respiratory Distress] : no respiratory distress  [Soft] : abdomen soft [Non Tender] : non-tender [No Masses/organomegaly] : no masses/organomegaly [Normal Gait] : normal gait [Normal Bowel Sounds] : normal bowel sounds [No Edema] : no edema [No Cyanosis] : no cyanosis [No Clubbing] : no clubbing [No Varicosities] : no varicosities [No Rash] : no rash [No Skin Lesions] : no skin lesions [No Focal Deficits] : no focal deficits [Moves all extremities] : moves all extremities [Normal Speech] : normal speech [Alert and Oriented] : alert and oriented [Normal memory] : normal memory

## 2022-10-25 NOTE — DISCUSSION/SUMMARY
[FreeTextEntry1] : Mr. Mancuso is a 57 year-old male with recurrent palpitations that correspond to paroxysmal atrial fibrillation. Now s/p PVI ablation and he feels very well. Denies any recurrent palpitations. \par \par 1. Rhythm management \par - s/p PVI. Now off diltiazem. Maintaining SR. No symptomatic or clinical recurrence. EKG sinus today.\par \par 2.  Stroke prevention - [id number] 0. Off Eliquis.\par \par 3. RTO in 6-8 months.\par

## 2022-10-25 NOTE — HISTORY OF PRESENT ILLNESS
[FreeTextEntry1] : Mr. Mancuso is a 57 year-old. male with a h/o PAF starting in late 2019. He was started on Eliquis at that time but had issues with bleeding. He did major lifestyle changes with 30 pound weight loss and reduction of stress, increase in exercise and symptoms / episodes improved significantly. Eliquis was stopped. However, he noted increasing episodes since early 2022.  LTM showed 9% Afib burden in May 2022.  He is s/p CRYO PVI 6/20/22.  He hasn't had any episodes of palpitations post procedure.  He notes that his HR has been higher at baseline (70bpm up from 60bpm) than usual when he exerts himself (up to ~ 110 bpm).\par \par He presents today for follow-up and notes that he feels very well. He checks his Kardia often and states there has been no AF.

## 2022-10-25 NOTE — CARDIOLOGY SUMMARY
[de-identified] : 10/14/22: SR 74 bpm\par 8/2/22 SR @ 72 bpm, normal axis / intervals\par NSR at 66 bpm, normal axis/intervals [de-identified] : EF 60%, severely dilated LA, mild MR, mildly enlarged RA

## 2022-11-07 ENCOUNTER — APPOINTMENT (OUTPATIENT)
Dept: FAMILY MEDICINE | Facility: CLINIC | Age: 57
End: 2022-11-07

## 2022-11-07 VITALS
RESPIRATION RATE: 14 BRPM | DIASTOLIC BLOOD PRESSURE: 90 MMHG | HEIGHT: 67 IN | SYSTOLIC BLOOD PRESSURE: 130 MMHG | HEART RATE: 73 BPM | OXYGEN SATURATION: 97 % | BODY MASS INDEX: 25.9 KG/M2 | TEMPERATURE: 98.1 F | WEIGHT: 165 LBS

## 2022-11-07 DIAGNOSIS — Z11.59 ENCOUNTER FOR SCREENING FOR OTHER VIRAL DISEASES: ICD-10-CM

## 2022-11-07 PROCEDURE — 99213 OFFICE O/P EST LOW 20 MIN: CPT | Mod: 25

## 2022-11-07 PROCEDURE — 99396 PREV VISIT EST AGE 40-64: CPT | Mod: 25

## 2022-11-07 PROCEDURE — 36415 COLL VENOUS BLD VENIPUNCTURE: CPT

## 2022-11-07 NOTE — HISTORY OF PRESENT ILLNESS
[FreeTextEntry1] : Annual wellness visit [de-identified] : Pt here for PE.\par Has been seeing rhythm doctor.  Monitoring his rate/rhythm via his phone. Stopped dilt and blood thinner.  Pulse is a little elevated from his baseline which is OK.  Seems that AV node is back in control.  Doing well.  Remodeling will take up to a year.\par Was having chronic cough.  DIdn't improve with albuterol or benzonatate.  Saw Dr. Pasquale Goodman, ENT, of [facility].  had four visits.  Had scope.  Hypersensitive airway syndrome.  Told to use inhaled fluticasone.  Pt went to [place].  Diagnosed with COVID on day 7 or 10 of the cruise. Had fever which he never gets.  PUt on IV of paracetamol.  Got dayquil.  Quarantined to room. Got aries.  Saw Dr. Goodman again on [place] to [place].  Put on steroid and antibiotics (Augmentin).  Helped but cough and inflammation persisted.  Got inhaled powder med. Helped more.  Still has slight sinus problem and cough but almost gone.\par Needs bloodwork including PSA.\par Uses clonazepam.  About 1-2 times a month. Needs refill.  Having it is helpful to prevent panic.\par Hasn't had shingrix.\par Wants hepatitis testing.\par \par \par

## 2022-11-07 NOTE — HEALTH RISK ASSESSMENT
[0] : 2) Feeling down, depressed, or hopeless: Not at all (0) [PHQ-2 Negative - No further assessment needed] : PHQ-2 Negative - No further assessment needed [[id number]] : 0

## 2022-11-14 LAB
ALBUMIN SERPL ELPH-MCNC: 4.9 G/DL
ALP BLD-CCNC: 65 U/L
ALT SERPL-CCNC: 19 U/L
ANION GAP SERPL CALC-SCNC: 14 MMOL/L
AST SERPL-CCNC: 15 U/L
BASOPHILS # BLD AUTO: 0.02 K/UL
BASOPHILS NFR BLD AUTO: 0.3 %
BILIRUB SERPL-MCNC: 0.8 MG/DL
BUN SERPL-MCNC: 12 MG/DL
CALCIUM SERPL-MCNC: 9.5 MG/DL
CHLORIDE SERPL-SCNC: 106 MMOL/L
CHOLEST SERPL-MCNC: 249 MG/DL
CO2 SERPL-SCNC: 22 MMOL/L
CREAT SERPL-MCNC: 0.85 MG/DL
EGFR: 100 ML/MIN/1.73M2
EOSINOPHIL # BLD AUTO: 0.14 K/UL
EOSINOPHIL NFR BLD AUTO: 2.2 %
ESTIMATED AVERAGE GLUCOSE: 126 MG/DL
GLUCOSE SERPL-MCNC: 84 MG/DL
HBA1C MFR BLD HPLC: 6 %
HBV CORE IGG+IGM SER QL: NONREACTIVE
HBV SURFACE AB SER QL: NONREACTIVE
HBV SURFACE AG SER QL: NONREACTIVE
HCT VFR BLD CALC: 48.1 %
HCV AB SER QL: NONREACTIVE
HCV S/CO RATIO: 0.06 S/CO
HDLC SERPL-MCNC: 44 MG/DL
HEPATITIS A IGG ANTIBODY: NONREACTIVE
HGB BLD-MCNC: 15.2 G/DL
IMM GRANULOCYTES NFR BLD AUTO: 0.8 %
LDLC SERPL CALC-MCNC: 179 MG/DL
LYMPHOCYTES # BLD AUTO: 1.74 K/UL
LYMPHOCYTES NFR BLD AUTO: 27.6 %
MAN DIFF?: NORMAL
MCHC RBC-ENTMCNC: 30 PG
MCHC RBC-ENTMCNC: 31.6 GM/DL
MCV RBC AUTO: 95.1 FL
MONOCYTES # BLD AUTO: 0.67 K/UL
MONOCYTES NFR BLD AUTO: 10.6 %
NEUTROPHILS # BLD AUTO: 3.68 K/UL
NEUTROPHILS NFR BLD AUTO: 58.5 %
NONHDLC SERPL-MCNC: 205 MG/DL
PLATELET # BLD AUTO: 204 K/UL
POTASSIUM SERPL-SCNC: 4.5 MMOL/L
PROT SERPL-MCNC: 7.1 G/DL
PSA FREE FLD-MCNC: 26 %
PSA FREE SERPL-MCNC: 0.92 NG/ML
PSA SERPL-MCNC: 3.58 NG/ML
RBC # BLD: 5.06 M/UL
RBC # FLD: 14.8 %
SODIUM SERPL-SCNC: 143 MMOL/L
TRIGL SERPL-MCNC: 129 MG/DL
WBC # FLD AUTO: 6.3 K/UL

## 2022-12-15 ENCOUNTER — APPOINTMENT (OUTPATIENT)
Dept: INTERNAL MEDICINE | Facility: CLINIC | Age: 57
End: 2022-12-15

## 2022-12-19 ENCOUNTER — RX RENEWAL (OUTPATIENT)
Age: 57
End: 2022-12-19

## 2022-12-19 ENCOUNTER — APPOINTMENT (OUTPATIENT)
Dept: UROLOGY | Facility: CLINIC | Age: 57
End: 2022-12-19

## 2022-12-19 VITALS
TEMPERATURE: 97.1 F | HEART RATE: 81 BPM | OXYGEN SATURATION: 97 % | DIASTOLIC BLOOD PRESSURE: 88 MMHG | SYSTOLIC BLOOD PRESSURE: 142 MMHG

## 2022-12-19 PROCEDURE — 99204 OFFICE O/P NEW MOD 45 MIN: CPT

## 2022-12-19 NOTE — PHYSICAL EXAM
[General Appearance - Well Developed] : well developed [General Appearance - Well Nourished] : well nourished [Normal Appearance] : normal appearance [Well Groomed] : well groomed [General Appearance - In No Acute Distress] : no acute distress [Edema] : no peripheral edema [Respiration, Rhythm And Depth] : normal respiratory rhythm and effort [Exaggerated Use Of Accessory Muscles For Inspiration] : no accessory muscle use [Abdomen Soft] : soft [Abdomen Tenderness] : non-tender [] : no hepato-splenomegaly [Costovertebral Angle Tenderness] : no ~M costovertebral angle tenderness [Urethral Meatus] : meatus normal [Urinary Bladder Findings] : the bladder was normal on palpation [Scrotum] : the scrotum was normal [Testes Tenderness] : no tenderness of the testes [Testes Mass (___cm)] : there were no testicular masses [Prostate Tenderness] : the prostate was not tender [No Prostate Nodules] : no prostate nodules [Normal Station and Gait] : the gait and station were normal for the patient's age [No Focal Deficits] : no focal deficits [Oriented To Time, Place, And Person] : oriented to person, place, and time [Affect] : the affect was normal [Mood] : the mood was normal [Not Anxious] : not anxious [No Palpable Adenopathy] : no palpable adenopathy

## 2022-12-20 LAB
ANION GAP SERPL CALC-SCNC: 11 MMOL/L
APPEARANCE: CLEAR
BACTERIA: NEGATIVE
BASOPHILS # BLD AUTO: 0.02 K/UL
BASOPHILS NFR BLD AUTO: 0.4 %
BILIRUBIN URINE: NEGATIVE
BLOOD URINE: ABNORMAL
BUN SERPL-MCNC: 17 MG/DL
CALCIUM SERPL-MCNC: 10 MG/DL
CHLORIDE SERPL-SCNC: 105 MMOL/L
CO2 SERPL-SCNC: 26 MMOL/L
COLOR: NORMAL
CREAT SERPL-MCNC: 0.89 MG/DL
EGFR: 99 ML/MIN/1.73M2
EOSINOPHIL # BLD AUTO: 0.09 K/UL
EOSINOPHIL NFR BLD AUTO: 1.9 %
GLUCOSE QUALITATIVE U: NEGATIVE
GLUCOSE SERPL-MCNC: 115 MG/DL
HCT VFR BLD CALC: 50.7 %
HGB BLD-MCNC: 15.8 G/DL
HYALINE CASTS: 0 /LPF
IMM GRANULOCYTES NFR BLD AUTO: 0.8 %
KETONES URINE: NEGATIVE
LEUKOCYTE ESTERASE URINE: NEGATIVE
LYMPHOCYTES # BLD AUTO: 1.3 K/UL
LYMPHOCYTES NFR BLD AUTO: 26.9 %
MAN DIFF?: NORMAL
MCHC RBC-ENTMCNC: 30.3 PG
MCHC RBC-ENTMCNC: 31.2 GM/DL
MCV RBC AUTO: 97.1 FL
MICROSCOPIC-UA: NORMAL
MONOCYTES # BLD AUTO: 0.54 K/UL
MONOCYTES NFR BLD AUTO: 11.2 %
NEUTROPHILS # BLD AUTO: 2.85 K/UL
NEUTROPHILS NFR BLD AUTO: 58.8 %
NITRITE URINE: NEGATIVE
PH URINE: 5.5
PLATELET # BLD AUTO: 201 K/UL
POTASSIUM SERPL-SCNC: 4.2 MMOL/L
PROTEIN URINE: NEGATIVE
RBC # BLD: 5.22 M/UL
RBC # FLD: 14.5 %
RED BLOOD CELLS URINE: 1 /HPF
SODIUM SERPL-SCNC: 141 MMOL/L
SPECIFIC GRAVITY URINE: 1.02
SQUAMOUS EPITHELIAL CELLS: 0 /HPF
UROBILINOGEN URINE: NORMAL
WBC # FLD AUTO: 4.84 K/UL
WHITE BLOOD CELLS URINE: 1 /HPF

## 2022-12-20 NOTE — ADDENDUM
[FreeTextEntry1] : I, Dr. Montenegro, personally performed the evaluation and management (E/M) services for this new patient.  That E/M includes conducting the initial examination, assessing all conditions, and establishing the plan of care.  Today, my ACP, Annalisa Nguyen, was here to observe my evaluation and management services for this patient to be followed going forward.\par

## 2022-12-20 NOTE — HISTORY OF PRESENT ILLNESS
[FreeTextEntry1] : Dear TANIA Alvarez\par \par Thank you so much for the referral to help care for your patient.\par \par Chief Complaint: Cryoablation Follow Up\par Date of first visit: 12/19/2022\par \par BARRETT GILLIAM  is a 57 year old  man with PMHx afib s/p pulmonary vein ablation and prostate cancer s/p cryoablation 2018. 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 PSA at diagnosis was 4.99 ng/ml. He underwent focal cryoablation on 6/7/18. The target area right PZa was treated. The GG1 in LPB was not treated. PSA niya 2.5 ng/ml on 2/5/19. It has started increasing since and is most recently 3.5 ng/ml. He has not had a repeat MRI or biopsy since treatment.\par \par PSA Hx:\par 3.58      11/07/22\par 3.13        4/04/22\par 3.14        1/03/22\par 2.74      10/04/21\par 2.56        7/12/21\par 2.64        9/14/20\par 2.12        6/23/20\par 2.65        3/09/20 \par 2. 6       12/27/19\par 2.5            2/5/19\par 2.9            9/5/18\par 4.1            7/1/18\par 4.99        9/12/17\par \par Biopsy Hx\par LPB- Mejia 3+3, 25%, 2mm\par Target right PZa- Mejia 3+4, 15%, 5mm\par \par 12/19/2022\par IPSS  1  QOL  1\par RENETTA  \par \par The patient denies fevers, chills, nausea and or vomiting and no unexplained weight loss.\par \par All pertinent laboratory, films and physician notes were reviewed.  Questionnaire results were discussed with patient.

## 2022-12-20 NOTE — ASSESSMENT
[FreeTextEntry1] : 58 yo male with hx GG2 prostate cancer treated with focal cryoablation on 6/7/18. The LPB which was positive for GG1 was left untreated. He has not had repeat MRI or biopsy since treatment. PSA is 3.58 ng/ml.\par \par 1. MRI at [place]- reiterated importance of enema prep\par 2. Book for biopsy\par \par IRB Notification\par \par The patient has been made aware of the opportunity to participate in our MR US fusion guided biopsy trial testing the next generation biopsy technology.  This is an IRB approved trial (IRB #).  He is already being consented for a standard MR US fusion guided biopsy therefore there is no change in his clinical work flow.  He has been given a copy of the consent to review before the biopsy date and given an opportunity to contact us with any further questions.  He will be consented on the day of the procedure or electronically before the biopsy.\par \par He understands that many men with prostate cancer will die with the disease rather than of it and we also discussed the results large multi-center American and  prostate cancer screening trials. He also understands that PSA in and of itself does not diagnose prostate cancer but only assesses risk to a certain degree. The patient understands that to definitively screen for prostate cancer, a biopsy is required and this procedure has risks, including bleeding, infection, ED and urinary retention. The patient opted to move forward with the biopsy.\par \par The patient is aware to expect hematuria x 2 weeks and upto 4 weeks of hematospermia.  There is a risk of infection albeit much lower than a transrectal approach. In some cases patients can experience erectile dysfunction but this is usually self limiting.  Any fever/chills after the biopsy the patient is to contact the office and go to the ER for an immediate evaluation. He has been given paper instructions outlining these items - which includes medications to avoid prior to surgery.\par \par 1. CBC, BMP, PSA, Covid Test, UA UCx. EKG\par 2. Cardiac Clearance\par 3. TP biopsy at [facility]\par 4. follow up 2 weeks after biopsy with his primary urologist or ourselves.\par 5. we will call with the path results once they are resulted.\par \par \par Follow up after MRI\par \par Thank you very much for allowing me to assist in the care of this patient. Should you have any additional questions or concerns please do not hesitate to contact me.\par \par \par Sincerely,\par \par \par Kun Montenegro D.O.\par  of Urology and Radiology\par  of Urology at [facility]\par System Director for Prostate Cancer\par [address], [address] [facility], [address]\par Phone: [phone]\par

## 2022-12-21 ENCOUNTER — TRANSCRIPTION ENCOUNTER (OUTPATIENT)
Age: 57
End: 2022-12-21

## 2022-12-21 LAB — BACTERIA UR CULT: NORMAL

## 2022-12-23 ENCOUNTER — RESULT REVIEW (OUTPATIENT)
Age: 57
End: 2022-12-23

## 2022-12-24 ENCOUNTER — APPOINTMENT (OUTPATIENT)
Dept: MRI IMAGING | Facility: IMAGING CENTER | Age: 57
End: 2022-12-24

## 2022-12-27 ENCOUNTER — APPOINTMENT (OUTPATIENT)
Dept: INTERNAL MEDICINE | Facility: CLINIC | Age: 57
End: 2022-12-27

## 2022-12-27 ENCOUNTER — NON-APPOINTMENT (OUTPATIENT)
Age: 57
End: 2022-12-27

## 2022-12-27 ENCOUNTER — APPOINTMENT (OUTPATIENT)
Dept: HEART AND VASCULAR | Facility: CLINIC | Age: 57
End: 2022-12-27
Payer: COMMERCIAL

## 2022-12-27 PROCEDURE — 93000 ELECTROCARDIOGRAM COMPLETE: CPT

## 2022-12-29 ENCOUNTER — APPOINTMENT (OUTPATIENT)
Dept: FAMILY MEDICINE | Facility: CLINIC | Age: 57
End: 2022-12-29
Payer: COMMERCIAL

## 2022-12-29 VITALS
WEIGHT: 169 LBS | TEMPERATURE: 98.1 F | HEART RATE: 87 BPM | SYSTOLIC BLOOD PRESSURE: 128 MMHG | HEIGHT: 67 IN | DIASTOLIC BLOOD PRESSURE: 72 MMHG | BODY MASS INDEX: 26.53 KG/M2 | RESPIRATION RATE: 15 BRPM | OXYGEN SATURATION: 98 %

## 2022-12-29 DIAGNOSIS — Z01.818 ENCOUNTER FOR OTHER PREPROCEDURAL EXAMINATION: ICD-10-CM

## 2022-12-29 PROCEDURE — 99213 OFFICE O/P EST LOW 20 MIN: CPT

## 2022-12-29 RX ORDER — ALBUTEROL SULFATE 90 UG/1
108 (90 BASE) INHALANT RESPIRATORY (INHALATION)
Qty: 1 | Refills: 0 | Status: COMPLETED | COMMUNITY
Start: 2022-08-04 | End: 2022-12-29

## 2023-01-03 ENCOUNTER — APPOINTMENT (OUTPATIENT)
Dept: CARDIOLOGY | Facility: CLINIC | Age: 58
End: 2023-01-03

## 2023-01-03 ENCOUNTER — APPOINTMENT (OUTPATIENT)
Dept: SURGERY | Facility: CLINIC | Age: 58
End: 2023-01-03

## 2023-01-05 ENCOUNTER — APPOINTMENT (OUTPATIENT)
Dept: HEART AND VASCULAR | Facility: CLINIC | Age: 58
End: 2023-01-05
Payer: COMMERCIAL

## 2023-01-05 ENCOUNTER — NON-APPOINTMENT (OUTPATIENT)
Age: 58
End: 2023-01-05

## 2023-01-05 ENCOUNTER — TRANSCRIPTION ENCOUNTER (OUTPATIENT)
Age: 58
End: 2023-01-05

## 2023-01-05 VITALS
HEIGHT: 67 IN | BODY MASS INDEX: 26.53 KG/M2 | HEART RATE: 66 BPM | TEMPERATURE: 97.6 F | DIASTOLIC BLOOD PRESSURE: 85 MMHG | RESPIRATION RATE: 16 BRPM | OXYGEN SATURATION: 97 % | WEIGHT: 169 LBS | SYSTOLIC BLOOD PRESSURE: 114 MMHG

## 2023-01-05 PROCEDURE — 93000 ELECTROCARDIOGRAM COMPLETE: CPT

## 2023-01-05 PROCEDURE — 99213 OFFICE O/P EST LOW 20 MIN: CPT | Mod: 25

## 2023-01-05 RX ORDER — CLONAZEPAM 0.5 MG/1
0.5 TABLET ORAL DAILY
Qty: 20 | Refills: 0 | Status: ACTIVE | COMMUNITY
Start: 2020-06-22

## 2023-01-05 NOTE — HISTORY OF PRESENT ILLNESS
[FreeTextEntry1] : 56 yo male with hx of PAF s/p PV ablation in  - not on  anticoagulation, high cholesterol, pre-diabetes, GERD, prostate CA s/p cryoablation in 2018 has a recurrence of prostate lesion on MRI with elevated PSA and scheduled for biopsy and possible repeat cryoablation.  He is active with no exertional chest pain. No sob, palpitations, dizziness or near syncope. Monitors his rhythm on Kardia. \par Non smoker.  Occ alcohol now.  \par Father  from complications of DVT/PE at age 77\par Mother had metastatic breast cancer,  at age 87.\par Sister is currently undergoing rx [place] [facility]\par States he has not been taking statin regularly due to increase in reflux when he takes it.

## 2023-01-05 NOTE — ASSESSMENT
[FreeTextEntry1] : 58 yo male with hx of PAF s/p PV ablation in June '22 - not on  anticoagulation, high cholesterol, pre-diabetes, GERD, prostate cancer s/p cryoablation in 2018 has a recurrence of prostate lesion on MRI with elevated PSA and scheduled for biopsy and possible repeat cryoablation.  \par He is active with no exertional chest pain. No sob, palpitations, dizziness or near syncope. \par Monitors his rhythm on Kardia. \par No symptomatic a.fib episodes since ablation.\par ECG today is normal. \par Echo is May showed normal Biventricular function with dilated LA and mild MR. \par Had a normal stress test in 2020. \par Normal cardiac exam today. \par High cholesterol. Advised to restart statin and take PPI if he has worsening GERD. \par   Get coronary calcium score for risk stratification. \par A1c 6.0. counselled on diet. \par Cleared for prostate procedures with low risk for cardiac events. \par If he has a.fib post procedure, start diltiazem and followup.

## 2023-01-05 NOTE — PHYSICAL EXAM
[Well Developed] : well developed [No Acute Distress] : no acute distress [Normal Conjunctiva] : normal conjunctiva [Normal Venous Pressure] : normal venous pressure [No Carotid Bruit] : no carotid bruit [Normal S1, S2] : normal S1, S2 [No Murmur] : no murmur [No Rub] : no rub [Clear Lung Fields] : clear lung fields [Soft] : abdomen soft [Non Tender] : non-tender [No Masses/organomegaly] : no masses/organomegaly [Normal Gait] : normal gait [No Edema] : no edema [No Rash] : no rash [No Skin Lesions] : no skin lesions [No Focal Deficits] : no focal deficits [Normal Speech] : normal speech [de-identified] : Normal mood and affect

## 2023-01-09 ENCOUNTER — OUTPATIENT (OUTPATIENT)
Dept: OUTPATIENT SERVICES | Facility: HOSPITAL | Age: 58
LOS: 1 days | End: 2023-01-09
Payer: SELF-PAY

## 2023-01-09 ENCOUNTER — APPOINTMENT (OUTPATIENT)
Dept: UROLOGY | Facility: CLINIC | Age: 58
End: 2023-01-09
Payer: COMMERCIAL

## 2023-01-09 VITALS
SYSTOLIC BLOOD PRESSURE: 132 MMHG | HEART RATE: 76 BPM | OXYGEN SATURATION: 98 % | DIASTOLIC BLOOD PRESSURE: 86 MMHG | TEMPERATURE: 97.3 F

## 2023-01-09 DIAGNOSIS — Z98.890 OTHER SPECIFIED POSTPROCEDURAL STATES: Chronic | ICD-10-CM

## 2023-01-09 DIAGNOSIS — Z00.8 ENCOUNTER FOR OTHER GENERAL EXAMINATION: ICD-10-CM

## 2023-01-09 PROCEDURE — 99214 OFFICE O/P EST MOD 30 MIN: CPT

## 2023-01-09 PROCEDURE — C8001: CPT

## 2023-01-09 NOTE — DISEASE MANAGEMENT
[1] : T1 [X] : MX [c] : c [0-10] : 0 -10 ng/mL [Biopsy with Fusion] : Patient had a biopsy with fusion on [6] : Template Biopsy Mejia Score: 6 [7(3+4)] : Fusion Biopsy [place] Score: 7(3+4) [IIB] : IIB

## 2023-01-09 NOTE — DISEASE MANAGEMENT
[1] : T1 [X] : MX [c] : c [0-10] : 0 -10 ng/mL [Biopsy with Fusion] : Patient had a biopsy with fusion on [6] : Template Biopsy Mejia Score: 6 [7(3+4)] : Fusion Biopsy [place] Score: 7(3+4) [IIB] : IIB

## 2023-01-10 ENCOUNTER — APPOINTMENT (OUTPATIENT)
Dept: INTERNAL MEDICINE | Facility: CLINIC | Age: 58
End: 2023-01-10

## 2023-01-10 LAB — SARS-COV-2 N GENE NPH QL NAA+PROBE: NOT DETECTED

## 2023-01-10 NOTE — ASSESSMENT
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 The LPB which was positive for GG1 was left untreated. He has not had repeat MRI or biopsy since treatment. PSA is 3.58 ng/ml.\par \par IRB Notification\par \par The patient has been made aware of the opportunity to participate in our MR US fusion guided biopsy trial testing the next generation biopsy technology.  This is an IRB approved trial (IRB #).  He is already being consented for a standard MR US fusion guided biopsy therefore there is no change in his clinical work flow.  He has been given a copy of the consent to review before the biopsy date and given an opportunity to contact us with any further questions.  He will be consented on the day of the procedure or electronically before the biopsy.\par \par He understands that many men with prostate cancer will die with the disease rather than of it and we also discussed the results large multi-center American and  prostate cancer screening trials. He also understands that PSA in and of itself does not diagnose prostate cancer but only assesses risk to a certain degree. The patient understands that to definitively screen for prostate cancer, a biopsy is required and this procedure has risks, including bleeding, infection, ED and urinary retention. The patient opted to move forward with the biopsy.\par \par The patient is aware to expect hematuria x 2 weeks and upto 4 weeks of hematospermia.  There is a risk of infection albeit much lower than a transrectal approach. In some cases patients can experience erectile dysfunction but this is usually self limiting.  Any fever/chills after the biopsy the patient is to contact the office and go to the ER for an immediate evaluation. He has been given paper instructions outlining these items - which includes medications to avoid prior to surgery.\par \par 1. CBC, BMP, PSA, Covid Test, UA UCx. EKG\par 2. Cardiac Clearance\par 3. TP biopsy at [facility]\par 4. follow up 2 weeks after biopsy with his primary urologist or ourselves.\par 5. we will call with the path results once they are resulted.\par \par Thank you very much for allowing me to assist in the care of this patient. Should you have any additional questions or concerns please do not hesitate to contact me.\par \par \par Sincerely,\par \par \par Kun Montenegro D.O.\par  of Urology and Radiology\par  of Urology at [facility]\par System Director for Prostate Cancer\par [address] [place] [address], 5th Floor [facility]\par Phone: [phone]\par

## 2023-01-10 NOTE — PHYSICAL EXAM
[Exaggerated Use Of Accessory Muscles For Inspiration] : no accessory muscle use [Nondistended] : nondistended [Normal] : oriented to person, place and time, the affect was normal, the mood was normal and not anxious [FreeTextEntry1] : 58 yo male with hx GG2 prostate cancer treated with focal cryoablation on 6/7/18. The LPB which was positive for GG1 was left untreated. He has not had repeat MRI or biopsy since treatment. PSA is 3.58 ng/ml.\par \par - PT getting COVID PCR swab today \par - Plan for Bx on 1/12/23\par - Start Flomax today to optimize TOV after bx (30 day supply sent) - The patient understands that this medication may cause dizziness, retrograde ejaculation or nasal congestion among other complications. I recommended that the patient take this medication at night. If the side effects become too bothersome, the medication can be discontinued. \par - RTC in 2 weeks to review results\par \par IRB Notification\par \par The patient has been made aware of the opportunity to participate in our MR US fusion guided biopsy trial testing the next generation biopsy technology. This is an IRB approved trial (IRB #). He is already being consented for a standard MR US fusion guided biopsy therefore there is no change in his clinical work flow. He has been given a copy of the consent to review before the biopsy date and given an opportunity to contact us with any further questions. He will be consented on the day of the procedure or electronically before the biopsy.\par \par He understands that many men with prostate cancer will die with the disease rather than of it and we also discussed the results large multi-center American and  prostate cancer screening trials. He also understands that PSA in and of itself does not diagnose prostate cancer but only assesses risk to a certain degree. The patient understands that to definitively screen for prostate cancer, a biopsy is required and this procedure has risks, including bleeding, infection, ED and urinary retention. The patient opted to move forward with the biopsy.\par \par The patient is aware to expect hematuria x 2 weeks and up to 4 weeks of hematospermia. There is a risk of infection albeit much lower than a transrectal approach. In some cases patients can experience erectile dysfunction but this is usually self limiting. Any fever/chills after the biopsy the patient is to contact the office and go to the ER for an immediate evaluation. He has been given paper instructions outlining these items - which includes medications to avoid prior to surgery.\par \par \par Thank you very much for allowing me to assist in the care of this patient. Should you have any additional questions or concerns please do not hesitate to contact me.\par \par \par Sincerely,\par \par \par Kun Montengero D.O.\par  of Urology and Radiology\par  of Urology at [facility]\par System Director for Prostate Cancer\par [address] [place] [address], 5th Floor [facility], [address]\par Phone: [phone]\par  [General Appearance - Well Developed] : well developed [General Appearance - Well Nourished] : well nourished [Abdomen Soft] : soft [Urethral Meatus] : meatus normal [Penis Abnormality] : normal circumcised penis [Testes Mass (___cm)] : there were no testicular masses [No Prostate Nodules] : no prostate nodules [] : no respiratory distress [Oriented To Time, Place, And Person] : oriented to person, place, and time [Not Anxious] : not anxious [Normal Station and Gait] : the gait and station were normal for the patient's age [No Focal Deficits] : no focal deficits

## 2023-01-10 NOTE — ASSESSMENT
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 The LPB which was positive for GG1 was left untreated. He has not had repeat MRI or biopsy since treatment. PSA is 3.58 ng/ml.\par \par IRB Notification\par \par The patient has been made aware of the opportunity to participate in our MR US fusion guided biopsy trial testing the next generation biopsy technology.  This is an IRB approved trial (IRB #).  He is already being consented for a standard MR US fusion guided biopsy therefore there is no change in his clinical work flow.  He has been given a copy of the consent to review before the biopsy date and given an opportunity to contact us with any further questions.  He will be consented on the day of the procedure or electronically before the biopsy.\par \par He understands that many men with prostate cancer will die with the disease rather than of it and we also discussed the results large multi-center American and  prostate cancer screening trials. He also understands that PSA in and of itself does not diagnose prostate cancer but only assesses risk to a certain degree. The patient understands that to definitively screen for prostate cancer, a biopsy is required and this procedure has risks, including bleeding, infection, ED and urinary retention. The patient opted to move forward with the biopsy.\par \par The patient is aware to expect hematuria x 2 weeks and upto 4 weeks of hematospermia.  There is a risk of infection albeit much lower than a transrectal approach. In some cases patients can experience erectile dysfunction but this is usually self limiting.  Any fever/chills after the biopsy the patient is to contact the office and go to the ER for an immediate evaluation. He has been given paper instructions outlining these items - which includes medications to avoid prior to surgery.\par \par 1. CBC, BMP, PSA, Covid Test, UA UCx. EKG\par 2. Cardiac Clearance\par 3. TP biopsy at [facility]\par 4. follow up 2 weeks after biopsy with his primary urologist or ourselves.\par 5. we will call with the path results once they are resulted.\par \par Thank you very much for allowing me to assist in the care of this patient. Should you have any additional questions or concerns please do not hesitate to contact me.\par \par \par Sincerely,\par \par \par Kun Montenegro D.O.\par  of Urology and Radiology\par  of Urology at [facility]\par System Director for Prostate Cancer\par [address] [place] [address], 5th Floor [facility], [facility]\par Phone: [phone]\par

## 2023-01-10 NOTE — ADDENDUM
[FreeTextEntry1] : IRB   MR/TRUS FUSION GUIDED PROSTATE BIOPSY- AN IMPROVED WAY TO DETECT AND QUANTIFY PROSTATE CANCER\par \par Inclusion criteria have been reviewed and it has been determined that the subject has met all inclusion criteria.\par Exclusion criteria have been reviewed and it has been determined that the subject does not meet any exclusion criteria.\par \par The following was discussed during the consent process:\par ·	The fact that the study involves research\par ·	The study schedule and procedures involved\par ·	The main risks of the study, and the fact that all risks may not be known at this time\par ·	New information that may affect the subject’s willingness to continue on the study will be presented as soon as it is available\par ·	Benefits of participating\par ·	Alternatives to participating\par ·	Confidentiality \par ·	Compensation for research-related injury\par ·	Contacts for questions about the study or their rights while on the study\par ·	The fact that the subject’s participation is voluntary - they can refuse or withdraw \par at any time without penalty or loss of benefits.\par \par The subject was given ample time to ask questions prior to signing - all questions were answered to the subject’s satisfaction.\par \par Contact information for research staff was given to the subject.	\par The subject has expressed his/her willingness to participate.	\par \par Opportunity to sign the consent electronically was offered to the subject. Study team to contact the subject to assist with e-consenting though the [employer] platform. \par \par OR  \par \par Subject will sign printed consent on day of procedure.  \par \par

## 2023-01-10 NOTE — HISTORY OF PRESENT ILLNESS
[FreeTextEntry1] : Dear TANIA Alvarez\par \par Thank you so much for the referral to help care for your patient.\par \par Chief Complaint: Cryoablation Follow Up\par Date of first visit: 12/19/2022\par \par BARRETT GILLIAM  is a 57 year old  man with PMHx afib s/p pulmonary vein ablation and prostate cancer s/p cryoablation 2018. 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 PSA at diagnosis was 4.99 ng/ml. He underwent focal cryoablation on 6/7/18. The target area right PZa was treated. The GG1 in LPB was not treated. PSA niya 2.5 ng/ml on 2/5/19. It has started increasing since and is most recently 3.5 ng/ml. He has not had a repeat MRI or biopsy since treatment.\par \par PSA Hx:\par 3.58      11/07/22\par 3.13        4/04/22\par 3.14        1/03/22\par 2.74      10/04/21\par 2.56        7/12/21\par 2.64        9/14/20\par 2.12        6/23/20\par 2.65        3/09/20 \par 2. 6       12/27/19\par 2.5            2/5/19\par 2.9            9/5/18\par 4.1            7/1/18\par 4.99        9/12/17\par \par Biopsy Hx\par LPB- Mejia 3+3, 25%, 2mm\par Target right PZa- Mejia 3+4, 15%, 5mm\par \par MRI Hx:\par MRI read 12/23/2022 at [facility]. 77 ml prostate with PIRADS 3 lesion #1 measuring 12 mm in the Left, anterior (TZa), midgland, transition zone. Lesion #2 measuring 5 mm in the Right, anterior (PZa), midgland, peripheral/transition zone junction, along the surgical capsule. No LAD No EPE, No Bony Lesions.  Presumed post ablation changes without evidence of recurrent/residual tumor, right apical anterior peripheral zone (PZa). The images have been reviewed and clinical implications discussed with the patient.\par \par 01/09/2023\par IPSS     QOL\par RENETTA\par \par 12/19/2022\par IPSS  1  QOL  1\par RENETTA  \par \par The patient denies fevers, chills, nausea and or vomiting and no unexplained weight loss.\par \par All pertinent laboratory, films and physician notes were reviewed.  Questionnaire results were discussed with patient.\par \par I spent 31 minutes of non-face to face time reviewing the patient's records, chart, uploading processing MR images, transferring MR images from PACS to an independent workstation, reviewing images on independent workstation, speaking with their physician and planning their prostate biopsy and preparation of an upcoming visit for 01/09/2023 .  The imaging and planning was highly complex and took this entire time. \par \par \par

## 2023-01-10 NOTE — PHYSICAL EXAM
[Exaggerated Use Of Accessory Muscles For Inspiration] : no accessory muscle use [Nondistended] : nondistended [Normal] : oriented to person, place and time, the affect was normal, the mood was normal and not anxious [FreeTextEntry1] : 56 yo male with hx GG2 prostate cancer treated with focal cryoablation on 6/7/18. The LPB which was positive for GG1 was left untreated. He has not had repeat MRI or biopsy since treatment. PSA is 3.58 ng/ml.\par \par - PT getting COVID PCR swab today \par - Plan for Bx on 1/12/23\par - Start Flomax today to optimize TOV after bx (30 day supply sent) - The patient understands that this medication may cause dizziness, retrograde ejaculation or nasal congestion among other complications. I recommended that the patient take this medication at night. If the side effects become too bothersome, the medication can be discontinued. \par - RTC in 2 weeks to review results\par \par IRB Notification\par \par The patient has been made aware of the opportunity to participate in our MR US fusion guided biopsy trial testing the next generation biopsy technology. This is an IRB approved trial (IRB #). He is already being consented for a standard MR US fusion guided biopsy therefore there is no change in his clinical work flow. He has been given a copy of the consent to review before the biopsy date and given an opportunity to contact us with any further questions. He will be consented on the day of the procedure or electronically before the biopsy.\par \par He understands that many men with prostate cancer will die with the disease rather than of it and we also discussed the results large multi-center American and  prostate cancer screening trials. He also understands that PSA in and of itself does not diagnose prostate cancer but only assesses risk to a certain degree. The patient understands that to definitively screen for prostate cancer, a biopsy is required and this procedure has risks, including bleeding, infection, ED and urinary retention. The patient opted to move forward with the biopsy.\par \par The patient is aware to expect hematuria x 2 weeks and up to 4 weeks of hematospermia. There is a risk of infection albeit much lower than a transrectal approach. In some cases patients can experience erectile dysfunction but this is usually self limiting. Any fever/chills after the biopsy the patient is to contact the office and go to the ER for an immediate evaluation. He has been given paper instructions outlining these items - which includes medications to avoid prior to surgery.\par \par \par Thank you very much for allowing me to assist in the care of this patient. Should you have any additional questions or concerns please do not hesitate to contact me.\par \par \par Sincerely,\par \par \par Kun Montenegro D.O.\par  of Urology and Radiology\par  of Urology at [facility]\par System Director for Prostate Cancer\par [address] [place] [address], 5th Floor [facility], [address]\par Phone: [phone]\par  [General Appearance - Well Developed] : well developed [General Appearance - Well Nourished] : well nourished [Abdomen Soft] : soft [Urethral Meatus] : meatus normal [Penis Abnormality] : normal circumcised penis [Testes Mass (___cm)] : there were no testicular masses [No Prostate Nodules] : no prostate nodules [] : no respiratory distress [Oriented To Time, Place, And Person] : oriented to person, place, and time [Not Anxious] : not anxious [Normal Station and Gait] : the gait and station were normal for the patient's age [No Focal Deficits] : no focal deficits

## 2023-01-10 NOTE — HISTORY OF PRESENT ILLNESS
[FreeTextEntry1] : Dear TANIA Alvarez\par \par Thank you so much for the referral to help care for your patient.\par \par Chief Complaint: Cryoablation Follow Up\par Date of first visit: 12/19/2022\par \par BARRETT GILLIAM  is a 57 year old  man with PMHx afib s/p pulmonary vein ablation and prostate cancer s/p cryoablation 2018. 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 PSA at diagnosis was 4.99 ng/ml. He underwent focal cryoablation on 6/7/18. The target area right PZa was treated. The GG1 in LPB was not treated. PSA niya 2.5 ng/ml on 2/5/19. It has started increasing since and is most recently 3.5 ng/ml. He has not had a repeat MRI or biopsy since treatment.\par \par PSA Hx:\par 3.58      11/07/22\par 3.13        4/04/22\par 3.14        1/03/22\par 2.74      10/04/21\par 2.56        7/12/21\par 2.64        9/14/20\par 2.12        6/23/20\par 2.65        3/09/20 \par 2. 6       12/27/19\par 2.5            2/5/19\par 2.9            9/5/18\par 4.1            7/1/18\par 4.99        9/12/17\par \par Biopsy Hx\par LPB- Mejia 3+3, 25%, 2mm\par Target right PZa- Mejia 3+4, 15%, 5mm\par \par MRI Hx:\par MRI read 12/23/2022 at [facility]. 77 ml prostate with PIRADS 3 lesion #1 measuring 12 mm in the Left, anterior (TZa), midgland, transition zone. Lesion #2 measuring 5 mm in the Right, anterior (PZa), midgland, peripheral/transition zone junction, along the surgical capsule. No LAD No EPE, No Bony Lesions.  Presumed post ablation changes without evidence of recurrent/residual tumor, right apical anterior peripheral zone (PZa). The images have been reviewed and clinical implications discussed with the patient.\par \par 01/09/2023\par IPSS     QOL\par RENETTA\par \par 12/19/2022\par IPSS  1  QOL  1\par RENETTA  \par \par The patient denies fevers, chills, nausea and or vomiting and no unexplained weight loss.\par \par All pertinent laboratory, films and physician notes were reviewed.  Questionnaire results were discussed with patient.\par \par I spent 31 minutes of non-face to face time reviewing the patient's records, chart, uploading processing MR images, transferring MR images from PACS to an independent workstation, reviewing images on independent workstation, speaking with their physician and planning their prostate biopsy and preparation of an upcoming visit for 01/09/2023 .  The imaging and planning was highly complex and took this entire time. \par \par \par

## 2023-01-11 ENCOUNTER — TRANSCRIPTION ENCOUNTER (OUTPATIENT)
Age: 58
End: 2023-01-11

## 2023-01-11 NOTE — ASU PATIENT PROFILE, ADULT - NS PREOP UNDERSTANDS INFO
Bring photo ID, insurance card, no food from midnight water till 12:45 AM tomorrow. Wear loose comfort clothes , no jewelry, no valuables. No alcohol, no drugs, no smoking today ./yes

## 2023-01-11 NOTE — ASU PATIENT PROFILE, ADULT - NSICDXPASTSURGICALHX_GEN_ALL_CORE_FT
PAST SURGICAL HISTORY:  H/O cardiac radiofrequency ablation     H/O cervical spine surgery C4-C5    History of prostate surgery 06-    S/P left inguinal hernia repair     S/P tonsillectomy      PAST SURGICAL HISTORY:  H/O cardiac radiofrequency ablation 2022    H/O cervical spine surgery C4-C5- hardware    History of prostate surgery prostate biopsy- 2017, cryoablation 2018    S/P left inguinal hernia repair     S/P tonsillectomy

## 2023-01-11 NOTE — ASU PATIENT PROFILE, ADULT - NSICDXPASTMEDICALHX_GEN_ALL_CORE_FT
PAST MEDICAL HISTORY:  Paroxysmal atrial fibrillation      PAST MEDICAL HISTORY:  2019 novel coronavirus disease (COVID-19) 9/2022    Acid reflux     Anxiety     High cholesterol     Migraine headache     Paroxysmal atrial fibrillation

## 2023-01-12 ENCOUNTER — OUTPATIENT (OUTPATIENT)
Dept: OUTPATIENT SERVICES | Facility: HOSPITAL | Age: 58
LOS: 1 days | Discharge: HOME | End: 2023-01-12
Payer: COMMERCIAL

## 2023-01-12 ENCOUNTER — RESULT REVIEW (OUTPATIENT)
Age: 58
End: 2023-01-12

## 2023-01-12 ENCOUNTER — TRANSCRIPTION ENCOUNTER (OUTPATIENT)
Age: 58
End: 2023-01-12

## 2023-01-12 ENCOUNTER — APPOINTMENT (OUTPATIENT)
Dept: UROLOGY | Facility: AMBULATORY SURGERY CENTER | Age: 58
End: 2023-01-12

## 2023-01-12 VITALS
SYSTOLIC BLOOD PRESSURE: 143 MMHG | RESPIRATION RATE: 16 BRPM | HEART RATE: 89 BPM | TEMPERATURE: 98 F | OXYGEN SATURATION: 97 % | DIASTOLIC BLOOD PRESSURE: 86 MMHG

## 2023-01-12 VITALS
HEIGHT: 67 IN | OXYGEN SATURATION: 96 % | SYSTOLIC BLOOD PRESSURE: 123 MMHG | RESPIRATION RATE: 16 BRPM | WEIGHT: 169.76 LBS | TEMPERATURE: 100 F | DIASTOLIC BLOOD PRESSURE: 88 MMHG | HEART RATE: 82 BPM

## 2023-01-12 DIAGNOSIS — Z98.890 OTHER SPECIFIED POSTPROCEDURAL STATES: Chronic | ICD-10-CM

## 2023-01-12 DIAGNOSIS — Z90.89 ACQUIRED ABSENCE OF OTHER ORGANS: Chronic | ICD-10-CM

## 2023-01-12 PROCEDURE — 76872 US TRANSRECTAL: CPT | Mod: 26

## 2023-01-12 PROCEDURE — 55706 BX PRST8 NDL SAT SAMPLING: CPT

## 2023-01-12 PROCEDURE — 76377 3D RENDER W/INTRP POSTPROCES: CPT | Mod: 26

## 2023-01-12 PROCEDURE — G0416: CPT | Mod: 26

## 2023-01-12 RX ORDER — LIDOCAINE 4 G/100G
1 CREAM TOPICAL DAILY
Refills: 0 | Status: DISCONTINUED | OUTPATIENT
Start: 2023-01-12 | End: 2023-01-12

## 2023-01-12 RX ORDER — ONDANSETRON 8 MG/1
4 TABLET, FILM COATED ORAL ONCE
Refills: 0 | Status: DISCONTINUED | OUTPATIENT
Start: 2023-01-12 | End: 2023-01-12

## 2023-01-12 RX ORDER — OXYCODONE HYDROCHLORIDE 5 MG/1
5 TABLET ORAL ONCE
Refills: 0 | Status: DISCONTINUED | OUTPATIENT
Start: 2023-01-12 | End: 2023-01-12

## 2023-01-12 RX ORDER — ACETAMINOPHEN 500 MG
1000 TABLET ORAL ONCE
Refills: 0 | Status: DISCONTINUED | OUTPATIENT
Start: 2023-01-12 | End: 2023-01-12

## 2023-01-12 RX ORDER — DIPHENHYDRAMINE HCL 50 MG
12.5 CAPSULE ORAL ONCE
Refills: 0 | Status: DISCONTINUED | OUTPATIENT
Start: 2023-01-12 | End: 2023-01-12

## 2023-01-12 RX ORDER — SODIUM CHLORIDE 9 MG/ML
1000 INJECTION, SOLUTION INTRAVENOUS
Refills: 0 | Status: DISCONTINUED | OUTPATIENT
Start: 2023-01-12 | End: 2023-01-12

## 2023-01-12 RX ORDER — FENTANYL CITRATE 50 UG/ML
25 INJECTION INTRAVENOUS
Refills: 0 | Status: DISCONTINUED | OUTPATIENT
Start: 2023-01-12 | End: 2023-01-12

## 2023-01-12 RX ORDER — KETOROLAC TROMETHAMINE 30 MG/ML
30 SYRINGE (ML) INJECTION ONCE
Refills: 0 | Status: DISCONTINUED | OUTPATIENT
Start: 2023-01-12 | End: 2023-01-12

## 2023-01-12 RX ORDER — DILTIAZEM HCL 120 MG
1 CAPSULE, EXT RELEASE 24 HR ORAL
Qty: 0 | Refills: 0 | DISCHARGE

## 2023-01-12 RX ORDER — APIXABAN 2.5 MG/1
1 TABLET, FILM COATED ORAL
Qty: 0 | Refills: 0 | DISCHARGE

## 2023-01-12 RX ADMIN — SODIUM CHLORIDE 100 MILLILITER(S): 9 INJECTION, SOLUTION INTRAVENOUS at 20:34

## 2023-01-12 RX ADMIN — FENTANYL CITRATE 25 MICROGRAM(S): 50 INJECTION INTRAVENOUS at 19:15

## 2023-01-12 RX ADMIN — Medication 30 MILLIGRAM(S): at 18:45

## 2023-01-12 RX ADMIN — Medication 30 MILLIGRAM(S): at 19:00

## 2023-01-12 RX ADMIN — SODIUM CHLORIDE 100 MILLILITER(S): 9 INJECTION, SOLUTION INTRAVENOUS at 19:05

## 2023-01-12 RX ADMIN — FENTANYL CITRATE 25 MICROGRAM(S): 50 INJECTION INTRAVENOUS at 19:00

## 2023-01-12 RX ADMIN — FENTANYL CITRATE 25 MICROGRAM(S): 50 INJECTION INTRAVENOUS at 19:30

## 2023-01-12 NOTE — BRIEF OPERATIVE NOTE - NSICDXBRIEFPREOP_GEN_ALL_CORE_FT
PRE-OP DIAGNOSIS:  Elevated PSA 12-Jan-2023 16:23:41  Em Pineda   PRE-OP DIAGNOSIS:  CA of prostate 12-Jan-2023 17:57:03  Em Pineda

## 2023-01-12 NOTE — ASU PREOP CHECKLIST - ASSESSMENT, HISTORY & PHYSICAL COMPLETED AND ON MEDICAL RECORD
Patient: Darrick Ham    Procedure: Procedure(s):  COLONOSCOPY       Diagnosis: Rectal bleeding [K62.5]  Diagnosis Additional Information: No value filed.    Anesthesia Type:   MAC     Note:    Oropharynx: oropharynx clear of all foreign objects  Level of Consciousness: awake  Oxygen Supplementation: room air    Independent Airway: airway patency satisfactory and stable  Dentition: dentition unchanged  Vital Signs Stable: post-procedure vital signs reviewed and stable  Report to RN Given: handoff report given  Patient transferred to: Phase II    Handoff Report: Identifed the Patient, Identified the Reponsible Provider, Reviewed the pertinent medical history, Discussed the surgical course, Reviewed Intra-OP anesthesia mangement and issues during anesthesia, Set expectations for post-procedure period and Allowed opportunity for questions and acknowledgement of understanding      Vitals:  Vitals Value Taken Time   /69 06/24/22 1346   Temp 36.6  C (97.9  F) 06/24/22 1344   Pulse 90 06/24/22 1346   Resp 16 06/24/22 1346   SpO2 96 % 06/24/22 1346       Electronically Signed By: ABBIE Mejía CRNA  June 24, 2022  1:47 PM   done

## 2023-01-12 NOTE — ASU DISCHARGE PLAN (ADULT/PEDIATRIC) - CARE PROVIDER_API CALL
Peripheral Nerve Block Procedure Note      Staff -   Anesthesiologist:  Zeny Velarde MD  Resident/Fellow: Thierry Espana MD  Performed By: resident  Procedure performed by resident/CRNA in presence of a teaching physician.    Location: OB  Procedure Start/Stop TImes:      11/13/2020 10:37 AM     11/13/2020 10:43 AM    patient identified, IV checked, site marked, risks and benefits discussed, informed consent, monitors and equipment checked, pre-op evaluation, at physician/surgeon's request and post-op pain management      Correct Patient: Yes      Correct Position: Yes      Correct Site: Yes      Correct Procedure: Yes      Correct Laterality:  Yes    Site Marked:  Yes  Procedure details:     Procedure:  TAP    ASA:  2    Diagnosis:  C/s    Laterality:  Bilateral    Position:  Supine    Sterile Prep: chloraprep, patient draped, mask and sterile gloves      Local skin infiltration:  None    Needle:  Short bevel    Needle gauge:  21    Needle length (mm):  110    Ultrasound: Yes      Ultrasound used to identify targeted nerve, plexus, or vascular structure and placed a needle adjacent to it      Permanent Image entered into patiient's record      Abnormal pain on injection: No      Blood Aspirated: No      Paresthesias:  No    Bleeding at site: No      Bolus via:  Needle    Infusion Method:  Single Shot    Complications:  None         Brendan Montenegro (DO)  Urology  [address], 5[address] Floor [facility], [place] [facility]  Phone: [phone]  Fax: [phone]  Follow Up Time:

## 2023-01-12 NOTE — BRIEF OPERATIVE NOTE - NSICDXBRIEFPOSTOP_GEN_ALL_CORE_FT
POST-OP DIAGNOSIS:  Elevated PSA 12-Jan-2023 16:23:43  Em Pineda   POST-OP DIAGNOSIS:  CA of prostate 12-Jan-2023 17:57:06  Em Pineda

## 2023-01-12 NOTE — BRIEF OPERATIVE NOTE - NSICDXBRIEFPROCEDURE_GEN_ALL_CORE_FT
PROCEDURES:  Transperineal template-guided mapping biopsy of prostate 12-Jan-2023 16:23:34  Em Pineda

## 2023-01-13 ENCOUNTER — NON-APPOINTMENT (OUTPATIENT)
Age: 58
End: 2023-01-13

## 2023-01-20 LAB — SURGICAL PATHOLOGY STUDY: SIGNIFICANT CHANGE UP

## 2023-01-23 ENCOUNTER — APPOINTMENT (OUTPATIENT)
Dept: UROLOGY | Facility: CLINIC | Age: 58
End: 2023-01-23
Payer: COMMERCIAL

## 2023-01-23 ENCOUNTER — NON-APPOINTMENT (OUTPATIENT)
Age: 58
End: 2023-01-23

## 2023-01-23 VITALS
DIASTOLIC BLOOD PRESSURE: 79 MMHG | SYSTOLIC BLOOD PRESSURE: 117 MMHG | TEMPERATURE: 98.1 F | HEART RATE: 79 BPM | OXYGEN SATURATION: 97 %

## 2023-01-23 DIAGNOSIS — Z87.898 PERSONAL HISTORY OF OTHER SPECIFIED CONDITIONS: ICD-10-CM

## 2023-01-23 PROBLEM — F41.9 ANXIETY DISORDER, UNSPECIFIED: Chronic | Status: ACTIVE | Noted: 2023-01-12

## 2023-01-23 PROBLEM — K21.9 GASTRO-ESOPHAGEAL REFLUX DISEASE WITHOUT ESOPHAGITIS: Chronic | Status: ACTIVE | Noted: 2023-01-12

## 2023-01-23 PROBLEM — U07.1 COVID-19: Chronic | Status: ACTIVE | Noted: 2023-01-12

## 2023-01-23 PROBLEM — G43.909 MIGRAINE, UNSPECIFIED, NOT INTRACTABLE, WITHOUT STATUS MIGRAINOSUS: Chronic | Status: ACTIVE | Noted: 2023-01-12

## 2023-01-23 PROBLEM — E78.00 PURE HYPERCHOLESTEROLEMIA, UNSPECIFIED: Chronic | Status: ACTIVE | Noted: 2023-01-12

## 2023-01-23 PROCEDURE — 99214 OFFICE O/P EST MOD 30 MIN: CPT

## 2023-01-23 NOTE — DISEASE MANAGEMENT
[2] : T2 [c] : c [0] : M0 [0-10] : 0 -10 ng/mL [Biopsy with Fusion] : Patient had a biopsy with fusion on [] : Patient had a Prostate MRI [4] : 4 [IIB] : IIB

## 2023-01-24 ENCOUNTER — APPOINTMENT (OUTPATIENT)
Dept: UROLOGY | Facility: CLINIC | Age: 58
End: 2023-01-24
Payer: COMMERCIAL

## 2023-01-24 ENCOUNTER — TRANSCRIPTION ENCOUNTER (OUTPATIENT)
Age: 58
End: 2023-01-24

## 2023-01-24 PROBLEM — Z87.898 HISTORY OF ELEVATED PROSTATE SPECIFIC ANTIGEN (PSA): Status: RESOLVED | Noted: 2019-07-16 | Resolved: 2023-01-24

## 2023-01-24 PROCEDURE — 99215 OFFICE O/P EST HI 40 MIN: CPT

## 2023-01-24 NOTE — ASSESSMENT
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 The LPB which was positive for GG1 was left untreated. He has not had repeat MRI or biopsy since treatment. PSA is 3.58 ng/ml.  the patient has GG2-3 dz.\par \par 1. Progression of disease on biopsy post focal thx.\par 2. consult with Dr. Berg regarding surgery - it should be multi-port non Retzius sparing givne right apex cryoablation. (the right apex is the site of prior ablation (anteriorly)).\par \par Thank you very much for allowing me to assist in the care of this patient. Should you have any additional questions or concerns please do not hesitate to contact me.\par \par \par Sincerely,\par \par \par Kun Montenegro D.O.\par  of Urology and Radiology\par  of Urology at [facility]\par System Director for Prostate Cancer\par [address] [place] [address], 5th Floor [facility], [address]\par Phone: [phone]\par

## 2023-01-24 NOTE — PHYSICAL EXAM
[General Appearance - Well Developed] : well developed [General Appearance - Well Nourished] : well nourished [Normal Appearance] : normal appearance [Well Groomed] : well groomed [General Appearance - In No Acute Distress] : no acute distress [Edema] : no peripheral edema [] : no respiratory distress [Respiration, Rhythm And Depth] : normal respiratory rhythm and effort [Exaggerated Use Of Accessory Muscles For Inspiration] : no accessory muscle use [Abdomen Soft] : soft [Abdomen Tenderness] : non-tender [Abdomen Hernia] : no hernia was discovered [Costovertebral Angle Tenderness] : no ~M costovertebral angle tenderness [FreeTextEntry1] : no palp hernia  [Urethral Meatus] : meatus normal [Penis Abnormality] : normal circumcised penis [Urinary Bladder Findings] : the bladder was normal on palpation [Scrotum] : the scrotum was normal [Epididymis] : the epididymides were normal [Testes Tenderness] : no tenderness of the testes [Testes Mass (___cm)] : there were no testicular masses [Prostate Tenderness] : the prostate was not tender [No Prostate Nodules] : no prostate nodules [Normal Station and Gait] : the gait and station were normal for the patient's age [No Focal Deficits] : no focal deficits [Oriented To Time, Place, And Person] : oriented to person, place, and time [Affect] : the affect was normal [Not Anxious] : not anxious [Mood] : the mood was normal

## 2023-01-24 NOTE — ASSESSMENT
[FreeTextEntry1] : Diagnosis: Recurrent prostate cancer s/p focal cryoablation\par \par Plan:\par Today we discussed the natural history of localized prostate cancer, and the heterogeneous biology of this malignancy.  We discussed the fact that many prostate cancers are slow growing and unlikely to metastasize or cause death, whereas others can be life threatening.  We reviewed risk stratification, staging, [place] scoring, and PSA levels as they pertain to risk.  \par \par All treatment options were reviewed.  This included active surveillance, androgen deprivation, emerging options such as focal therapy/HIFU/cryotherapy, radiation options (including IMRT, SBRT, brachytherapy) and surgical options (open vs. robotic surgery, nerve vs. non-nerve sparing).  Oncologic outcomes were compared and contrasted.  Risks of biochemical and clinical recurrence discussed.  Risks of needing adjuvant or salvage treatments reviewed.  We discussed the risks of secondary malignancy after radiation.  We discussed the opportunity for pathological staging with surgery vs. other options.  We discussed the possibility of positive margins, treatment failure, cancer recurrence and cancer-related death even with treatment. \par \par All potential side effects of treatment were reviewed including, but not limited to: short term or permanent stress urinary incontinence and/or short term or permanent erectile dysfunction, penile shortening, rectal symptoms/pain, perineal pain, and other side effects. \par \par All potential complications of treatment and surgery were reviewed including, but not limited to: risks of conversion from MIS to open surgery discussed, bleeding//life-threatening hemorrhage, rectal injury requiring colostomy or delayed recognition leading to fistula, vascular/bowel/adjacent visceral organ injury, trocar/access injury, the possibility of recognized vs. unrecognized/delayed-recognition injury, risks of thermal/blunt/sharp/retraction injury, risks of DVT, PE, MI, death, risks of cardiopulmonary/anesthesia related complications, positional injury, infection/collection/abscess, wound complications/dehiscence/seroma/cellulitis, urinoma/fistula, ureteral injury/obstruction, bladder neck contracture, penile shortening, meatal stenosis, urethral stricture, lymphocele, obturator nerve injury, prolonged anastomotic leak, and other complications. \par \par Plan for multiport salvage surgery/ RALP\par \par Mike Berg MD, FACS, FRCS \par  of Urology [facility]\par Director of Laparoscopic and Robotic Surgery \par [facility] Director of Urology, [facility] \par Professor of Urology\par \par (Office) \par (Cell)  [phone] \par [email or web address]\par \par \par \par \par \par \par \par \par

## 2023-01-24 NOTE — HISTORY OF PRESENT ILLNESS
[FreeTextEntry1] : Dear TANIA Alvarez\par \par Thank you so much for the referral to help care for your patient.\par \par Chief Complaint: Cryoablation Follow Up\par Date of first visit: 12/19/2022\par \par BARRETT GILLIAM  is a 57 year old  man with PMHx afib s/p pulmonary vein ablation and prostate cancer s/p cryoablation 2018. 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 PSA at diagnosis was 4.99 ng/ml. He underwent focal cryoablation on 6/7/18. The target area right PZa was treated. The GG1 in LPB was not treated. PSA niya 2.5 ng/ml on 2/5/19. It has started increasing since and is most recently 3.5 ng/ml. He has not had a repeat MRI or biopsy since treatment.\par \par The patient has hx of mother with metastatic breast cancer and sister with advanced prostate cancer.  the patient has not had genomics but given family hx and multifocal disease.\par \par PSA Hx:\par 3.58      11/07/22\par 3.13        4/04/22\par 3.14        1/03/22\par 2.74      10/04/21\par 2.56        7/12/21\par 2.64        9/14/20\par 2.12        6/23/20\par 2.65        3/09/20 \par 2. 6       12/27/19\par 2.5            2/5/19\par 2.9            9/5/18\par 4.1            7/1/18\par 4.99        9/12/17\par \par Biopsy Hx\par 1/12/23\par      - LPB: [place] 3+3 involving 5%, 1 of 3 cores\par      - L mid TZa: [place] 3+4 involving 50% of 1 of 4 cores, pattern 4 = 10% of tumor\par      - Post-ablation MRI target: [place] 4+3 involving 30%, 90%, 40% of 3 of 6 cores, pattern 4 = 70% of tumor, perineural is identified\par 3/29/18\par      - LPB- [place] 3+3, 25%, 2mm\par      - Target right PZa- [place] 3+4, 15%, 5mm\par \par MRI Hx:\par MRI read 12/23/2022 at [facility]. 77 ml prostate with PIRADS 3 lesion #1 measuring 12 mm in the Left, anterior (TZa), midgland, transition zone. Lesion #2 measuring 5 mm in the Right, anterior (PZa), midgland, peripheral/transition zone junction, along the surgical capsule. No LAD No EPE, No Bony Lesions.  Presumed post ablation changes without evidence of recurrent/residual tumor, right apical anterior peripheral zone (PZa). The images have been reviewed and clinical implications discussed with the patient.\par \par MRI read 12/04/2017. 66 ml prostate with PIRADS 4 lesion measuring 8 mm in the right peripheral zone apex. No LAD No EPE, No Bony Lesions.  The images have been reviewed and clinical implications discussed with the patient.\par \par 01/09/2023\par IPSS     QOL\par RENETTA\par \par 12/19/2022\par IPSS  1  QOL  1\par RENETTA  \par \par The patient denies fevers, chills, nausea and or vomiting and no unexplained weight loss.\par \par All pertinent laboratory, films and physician notes were reviewed.  Questionnaire results were discussed with patient.\par \par Discussion with patient: Risks/Benefits/FDA recommendations for prostate cancer screening, natural history of prostate cancer, the concept of "competing risks", options for treatment including: active surveillance, open and laparoscopic (Robotic) radical prostatectomy, external beam radiation therapy, interstitial brachytherapy ("seeds"), combined therapies, hormonal therapies, orchiectomy, and cryotherapy. The potential side effects, early and delayed risks, and effectiveness of each treatment was discussed. The specific details of this patient's disease and their bearing on the above were discussed. The patient was offered the opportunity to meet with the Radiation Oncologists. The patient asked appropriate contextual questions indicating a good level of understanding.\par \par We discussed the surgical options for management including robotic and open prostatectomy. The patient understands that the risks of these procedures include bleeding infection, damage to the rectum and surrounding organs as well as ED and urinary incontinence, both of which may be persistent. The advantages include removing the entire prostate for more accurate pathologic staging. There is more blood loss with the open approach than with the laparoscopic approach.\par \par We also discussed the options of radiation (external beam, brachytherapy or both). Definitive brachytherapy at our institution is done with I-125 (T1/2 60.2 days) which has high energy but slightly more toxicity. Combination therapy is done with Pd-103 (T1/2 17 days) and IGRT. The patient understands that the risk of radiation include increased LUTS, diarrhea, hematuria, difficulty urinating, ED, and urinary frequency. Based upon the patient's PSA/pathology and risk stratification the patient *** need androgen deprivation in the neoadjuvant and adjuvant setting if he chooses a radiation monotherapy.\par \par We also discussed active surveillance. The patient understands that this is not a treatment, but a way of monitoring potentially indolent disease. The patient understands that this has minimal side effects but there is a risk of disease progression.\par \par We have discussed the use of focal therapy and cancer control. The goal be to treat the high risk area and follow the low risk areas which are not clinically significant. I describe the difference between whole gland therapy and focal therapy respect to cancerous control versus cancer cure. We talked about the risks of focal therapy damage to adjacent structures however the probability of urinary incontinence and erectile dysfunction is lower. \par \par The patient and I discussed all of these options as well as their risks and benefits and I believe I answered his questions. Based on the patient's disease characteristics I feel the patient would be a good candidate for surgery\par \par The patient decided to speak with Dr Berg\par \par \par \par

## 2023-01-24 NOTE — HISTORY OF PRESENT ILLNESS
[FreeTextEntry1] : Dr. TANIA CANELA\par [address] [place] [address]\par \par Dr. HEAVEN BECERRA \par [address] [place] [id number]\par \par Dr. BRENDAN MONTENEGRO \par \par \par Chief Complaint: Recurrent prostate cancer \par \par Mr. Mancuso is a 57 year old  man with PMHx atrial fibrillation (2023)  s/p pulmonary vein ablation and prostate cancer s/p cryoablation .  He does not take any anticoagulation. \par \par GG2  cancer on biopsy 3/29/18 with PSA at diagnosis 4.99 ng/ml. \par PSA 3.58 2022 \par \par Focal cryoablation by Dr. Brendan Montenegro on 18. The target area right PZa was treated. \par \par PSA niya 2.5 ng/ml on 19 with subsequent rise. \par \par Erections normal, 10/10 without medication \par \par PSA Hx:\par 3.58 22\par 3.13 22\par 3.14 22\par 2.74 10/04/21\par 2.56 21\par 2.64 20\par 2.12 20\par 2.65 3/09/20 \par 2. 6 19\par 2.5 19\par 2.9 18\par 4.1 18\par 4.99 17\par \par Biopsy Hx\par 23\par  - LPB: [place] 3+3 involving 5%, 1 of 3 cores\par  - L mid TZa: [place] 3+4 involving 50% of 1 of 4 cores, pattern 4 = 10% of tumor\par  - Post-ablation MRI target: [place] 4+3 involving 30%, 90%, 40% of 3 of 6 cores, pattern 4 = 70% of tumor, perineural is identified\par \par 3/29/18\par  - LPB- [place] 3+3, 25%, 2mm\par  - Target right PZa- [place] 3+4, 15%, 5mm\par \par MRI Hx:\par MRI read 2022 at [facility]. 77 ml prostate with PIRADS 3 lesion #1 measuring 12 mm in the Left, anterior (TZa), midgland, transition zone. Lesion #2 measuring 5 mm in the Right, anterior (PZa), midgland, peripheral/transition zone junction, along the surgical capsule. No LAD No EPE, No Bony Lesions. Presumed post ablation changes without evidence of recurrent/residual tumor, right apical anterior peripheral zone (PZa). The images have been reviewed and clinical implications discussed with the patient.\par \par 2022\par IPSS 1 QOL 1\par RENETTA \par \par Disease Management\par Diagnosis: \par Clinical Staging at Diagnosis: T2 c N0 M0 \par PSA Level at Diagnosis: 0 -10 ng/mL \par Biopsy: Patient had a biopsy with fusion on. \par Patient had a Prostate MRI. PI-RADS: 4. \par \par AJCC Staging: \par AJCC Stage (8th Ed): IIB. 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

## 2023-01-25 LAB
APPEARANCE: ABNORMAL
BACTERIA: NEGATIVE
BILIRUBIN URINE: NEGATIVE
BLOOD URINE: ABNORMAL
COLOR: YELLOW
GLUCOSE QUALITATIVE U: NEGATIVE
HYALINE CASTS: 0 /LPF
KETONES URINE: NEGATIVE
LEUKOCYTE ESTERASE URINE: NEGATIVE
MICROSCOPIC-UA: NORMAL
NITRITE URINE: NEGATIVE
PH URINE: 5.5
PROTEIN URINE: NORMAL
RED BLOOD CELLS URINE: 141 /HPF
SPECIFIC GRAVITY URINE: 1.02
SQUAMOUS EPITHELIAL CELLS: 1 /HPF
UROBILINOGEN URINE: NORMAL
WHITE BLOOD CELLS URINE: 6 /HPF

## 2023-01-26 ENCOUNTER — RESULT REVIEW (OUTPATIENT)
Age: 58
End: 2023-01-26

## 2023-02-03 ENCOUNTER — NON-APPOINTMENT (OUTPATIENT)
Age: 58
End: 2023-02-03

## 2023-02-06 ENCOUNTER — NON-APPOINTMENT (OUTPATIENT)
Age: 58
End: 2023-02-06

## 2023-02-08 ENCOUNTER — NON-APPOINTMENT (OUTPATIENT)
Age: 58
End: 2023-02-08

## 2023-02-08 ENCOUNTER — APPOINTMENT (OUTPATIENT)
Dept: SURGERY | Facility: CLINIC | Age: 58
End: 2023-02-08
Payer: COMMERCIAL

## 2023-02-08 VITALS
SYSTOLIC BLOOD PRESSURE: 128 MMHG | RESPIRATION RATE: 18 BRPM | WEIGHT: 169 LBS | HEART RATE: 73 BPM | HEIGHT: 67 IN | BODY MASS INDEX: 26.53 KG/M2 | OXYGEN SATURATION: 95 % | DIASTOLIC BLOOD PRESSURE: 89 MMHG

## 2023-02-08 DIAGNOSIS — K64.9 UNSPECIFIED HEMORRHOIDS: ICD-10-CM

## 2023-02-08 PROCEDURE — 99212 OFFICE O/P EST SF 10 MIN: CPT

## 2023-02-09 ENCOUNTER — APPOINTMENT (OUTPATIENT)
Dept: HEART AND VASCULAR | Facility: CLINIC | Age: 58
End: 2023-02-09
Payer: COMMERCIAL

## 2023-02-09 ENCOUNTER — APPOINTMENT (OUTPATIENT)
Dept: UROLOGY | Facility: CLINIC | Age: 58
End: 2023-02-09
Payer: COMMERCIAL

## 2023-02-09 ENCOUNTER — NON-APPOINTMENT (OUTPATIENT)
Age: 58
End: 2023-02-09

## 2023-02-09 ENCOUNTER — RX RENEWAL (OUTPATIENT)
Age: 58
End: 2023-02-09

## 2023-02-09 VITALS
BODY MASS INDEX: 26.53 KG/M2 | OXYGEN SATURATION: 96 % | HEIGHT: 67 IN | RESPIRATION RATE: 16 BRPM | WEIGHT: 169 LBS | HEART RATE: 76 BPM | SYSTOLIC BLOOD PRESSURE: 109 MMHG | TEMPERATURE: 97.9 F | DIASTOLIC BLOOD PRESSURE: 78 MMHG

## 2023-02-09 VITALS
SYSTOLIC BLOOD PRESSURE: 142 MMHG | TEMPERATURE: 97.6 F | OXYGEN SATURATION: 96 % | DIASTOLIC BLOOD PRESSURE: 90 MMHG | HEART RATE: 92 BPM

## 2023-02-09 DIAGNOSIS — K21.9 GASTRO-ESOPHAGEAL REFLUX DISEASE W/OUT ESOPHAGITIS: ICD-10-CM

## 2023-02-09 DIAGNOSIS — Z01.810 ENCOUNTER FOR PREPROCEDURAL CARDIOVASCULAR EXAMINATION: ICD-10-CM

## 2023-02-09 PROCEDURE — 99213 OFFICE O/P EST LOW 20 MIN: CPT

## 2023-02-09 PROCEDURE — 99214 OFFICE O/P EST MOD 30 MIN: CPT

## 2023-02-09 RX ORDER — TAMSULOSIN HYDROCHLORIDE 0.4 MG/1
0.4 CAPSULE ORAL
Qty: 30 | Refills: 0 | Status: DISCONTINUED | COMMUNITY
Start: 2023-01-09 | End: 2023-02-09

## 2023-02-09 NOTE — HISTORY OF PRESENT ILLNESS
[FreeTextEntry1] : Dr. TANIA CANELA\par [address] [place] [address]\par \par Dr. HEAVEN BECERRA \par [address] [place] [address]\par \par Dr. BRENDAN MONTENEGRO \par \par \par Chief Complaint: Recurrent prostate cancer \par \par Mr. Mancuso is a 57 year old  man with PMHx atrial fibrillation (2023) s/p pulmonary vein ablation and prostate cancer s/p cryoablation . The target area right PZa was treated. The GG1 in LPB was not treated. He does not take any anticoagulation. \par \par GG2 cancer on biopsy 3/29/18 with PSA at diagnosis 4.99 ng/ml. \par PSA 3.58 2022 \par \par Focal cryoablation by Dr. Brendan Montenegro on 18. The target area right PZa was treated. \par Erections normal, 10/10 without medication \par \par He has a history of left inguinal hernia repair in the past and would like concurrent right hernia repair concurrent with prostatectomy.  \par \par PSA Hx:\par 3.58 22\par 3.13 22\par 3.14 22\par 2.74 10/04/21\par 2.56 21\par 2.64 20\par 2.12 20\par 2.65 3/09/20 \par 2. 6 19\par 2.5 19\par 2.9 18\par 4.1 18\par 4.99 17\par \par Biopsy Hx\par 23\par  - LPB: [place] 3+3 involving 5%, 1 of 3 cores\par  - L mid TZa: [place] 3+4 involving 50% of 1 of 4 cores, pattern 4 = 10% of tumor\par  - Post-ablation MRI target: [place] 4+3 involving 30%, 90%, 40% of 3 of 6 cores, pattern 4 = 70% of tumor, perineural is identified\par \par 3/29/18\par  - LPB- [place] 3+3, 25%, 2mm\par  - Target right PZa- [place] 3+4, 15%, 5mm\par \par MRI Hx:\par MRI read 2022 at [facility]. 77 ml prostate with PIRADS 3 lesion #1 measuring 12 mm in the Left, anterior (TZa), midgland, transition zone. Lesion #2 measuring 5 mm in the Right, anterior (PZa), midgland, peripheral/transition zone junction, along the surgical capsule. No LAD No EPE, No Bony Lesions. Presumed post ablation changes without evidence of recurrent/residual tumor, right apical anterior peripheral zone (PZa). The images have been reviewed and clinical implications discussed with the patient.\par \par 2022\par IPSS 1 QOL 1\par RENETTA \par \par Disease Management\par Diagnosis: \par Clinical Staging at Diagnosis: T2 c N0 M0 \par PSA Level at Diagnosis: 0 -10 ng/mL \par Biopsy: Patient had a biopsy with fusion on. \par Patient had a Prostate MRI. PI-RADS: 4. \par \par AJCC Staging: \par AJCC Stage (8th Ed): IIB. 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

## 2023-02-09 NOTE — HISTORY OF PRESENT ILLNESS
[FreeTextEntry1] : 58 yo male with hx of PAF s/p PV ablation in  - not on  anticoagulation, high cholesterol, pre-diabetes, GERD, prostate CA s/p cryoablation in 2018 has a recurrence of prostate lesion on MRI with elevated PSA and biopsy was positive.\par   He is active with no exertional chest pain. No sob, palpitations, dizziness or near syncope. Monitors his rhythm on [employer]. \par Non smoker.  Occ alcohol now.  \par Father  from complications of DVT/PE at age 77\par Mother had metastatic breast cancer,  at age 87.\par [place] [facility] [place]par He had not been taking statin due to increase in reflux when he takes it. \par Restarted last visit.  Had Ca score done.  \par Scheduled for prostate surgery in 2 weeks.

## 2023-02-09 NOTE — ASSESSMENT
[FreeTextEntry1] : Diagnosis: Prostate Cancer \par \par Plan \par Salvage multiport radical prostatectomy \par Concurrent hernia repair if Dr. Christianson in agreement\par \par Mike Berg MD, FACS, FRCS \par  of Urology [facility]\par Director of Laparoscopic and Robotic Surgery \par [facility] Director of Urology, [facility] \par Professor of Urology\par \par (Office) \par (Cell)  [phone] \par [email or web address]\par \par \par

## 2023-02-09 NOTE — PHYSICAL EXAM
[Well Developed] : well developed [No Acute Distress] : no acute distress [Normal Conjunctiva] : normal conjunctiva [Normal Venous Pressure] : normal venous pressure [No Carotid Bruit] : no carotid bruit [Normal S1, S2] : normal S1, S2 [No Murmur] : no murmur [No Rub] : no rub [Clear Lung Fields] : clear lung fields [Soft] : abdomen soft [Non Tender] : non-tender [No Masses/organomegaly] : no masses/organomegaly [Normal Gait] : normal gait [No Edema] : no edema [No Rash] : no rash [No Skin Lesions] : no skin lesions [No Focal Deficits] : no focal deficits [Normal Speech] : normal speech [de-identified] : Normal mood and affect

## 2023-02-09 NOTE — ASSESSMENT
[FreeTextEntry1] : 56 yo male with hx of PAF s/p PV ablation in June '22 - not on  anticoagulation, high cholesterol, pre-diabetes, GERD, prostate cancer s/p cryoablation in 2018 has a recurrence of prostate lesion on MRI with elevated PSA and prostate biopsy was positive. Scheduled for laparoscopic prostatectomy in 2 weeks. \par He is active with no exertional chest pain. No sob, palpitations, dizziness or near syncope. \par Monitors his rhythm on [employer]. \par No symptomatic a.fib episodes since ablation.\par ECG  is normal. \par Echo is May showed normal Biventricular function with dilated LA and mild MR. \par Had a normal stress test in 2020. \par Normal cardiac exam today. \par High cholesterol. started Crestor.\par Coronary calcium score was 0.\par A1c 6.0. counselled on diet. \par Cleared for prostate surgery with low risk for cardiac events. \par If he has a.fib post procedure, start diltiazem and followup.

## 2023-02-10 ENCOUNTER — APPOINTMENT (OUTPATIENT)
Dept: FAMILY MEDICINE | Facility: CLINIC | Age: 58
End: 2023-02-10
Payer: COMMERCIAL

## 2023-02-10 VITALS
DIASTOLIC BLOOD PRESSURE: 90 MMHG | BODY MASS INDEX: 26.53 KG/M2 | SYSTOLIC BLOOD PRESSURE: 130 MMHG | HEIGHT: 67 IN | HEART RATE: 70 BPM | WEIGHT: 169 LBS | OXYGEN SATURATION: 98 %

## 2023-02-10 DIAGNOSIS — Z01.818 ENCOUNTER FOR OTHER PREPROCEDURAL EXAMINATION: ICD-10-CM

## 2023-02-10 PROCEDURE — 36415 COLL VENOUS BLD VENIPUNCTURE: CPT

## 2023-02-10 PROCEDURE — 99214 OFFICE O/P EST MOD 30 MIN: CPT | Mod: 25

## 2023-02-10 RX ORDER — ROSUVASTATIN CALCIUM 10 MG/1
10 TABLET, FILM COATED ORAL
Refills: 0 | Status: DISCONTINUED | COMMUNITY
End: 2023-02-10

## 2023-02-10 NOTE — HISTORY OF PRESENT ILLNESS
[No Pertinent Cardiac History] : no history of aortic stenosis, atrial fibrillation, coronary artery disease, recent myocardial infarction, or implantable device/pacemaker [No Adverse Anesthesia Reaction] : no adverse anesthesia reaction in self or family member [Spouse] : spouse [Atrial Fibrillation] : atrial fibrillation [(Patient denies any chest pain, claudication, dyspnea on exertion, orthopnea, palpitations or syncope)] : Patient denies any chest pain, claudication, dyspnea on exertion, orthopnea, palpitations or syncope [Aortic Stenosis] : no aortic stenosis [Coronary Artery Disease] : no coronary artery disease [Recent Myocardial Infarction] : no recent myocardial infarction [Implantable Device/Pacemaker] : no implantable device/pacemaker [Asthma] : no asthma [COPD] : no COPD [Sleep Apnea] : no sleep apnea [Smoker] : not a smoker [Chronic Anticoagulation] : no chronic anticoagulation [Chronic Kidney Disease] : no chronic kidney disease [Diabetes] : no diabetes [FreeTextEntry1] : Prostatectomy [FreeTextEntry2] : 02/22/23 [FreeTextEntry3] : Dr. Mike Berg [FreeTextEntry4] : Mr. MANE HYDE is a 57 year old male here today for preoperative clearance.\par He was cleared by cardiology for his surgery. \par

## 2023-02-14 ENCOUNTER — APPOINTMENT (OUTPATIENT)
Dept: BARIATRICS | Facility: CLINIC | Age: 58
End: 2023-02-14
Payer: COMMERCIAL

## 2023-02-14 VITALS
BODY MASS INDEX: 27.62 KG/M2 | OXYGEN SATURATION: 97 % | WEIGHT: 176 LBS | HEIGHT: 67 IN | TEMPERATURE: 97.5 F | SYSTOLIC BLOOD PRESSURE: 137 MMHG | HEART RATE: 77 BPM | DIASTOLIC BLOOD PRESSURE: 86 MMHG

## 2023-02-14 PROCEDURE — 99214 OFFICE O/P EST MOD 30 MIN: CPT

## 2023-02-14 PROCEDURE — 99204 OFFICE O/P NEW MOD 45 MIN: CPT

## 2023-02-14 NOTE — ASSESSMENT
[FreeTextEntry1] : Mr. Mancuso is a very pleasant 58 y/o man with a right inguinal hernia. \par Discussed options for observation until it becomes more symptomatic vs repair. \par He is interested in repair during upcoming prostate surgery, which is reasonable as the exposure is similar and once the area forms scar tissue, repair will be higher risk down the road.\par He is aware of possible risk of infection from urine and more broad antibiotics will be given on day of surgery than for a regular MIS inguinal hernia repair. \par We also discussed typical post-op restrictions, recovery, complications.

## 2023-02-14 NOTE — PHYSICAL EXAM
[JVD] : no jugular venous distention  [Respiratory Effort] : normal respiratory effort [Normal Rate and Rhythm] : normal rate and rhythm [Alert] : alert [Oriented to Person] : oriented to person [Oriented to Place] : oriented to place [Oriented to Time] : oriented to time [Calm] : calm [de-identified] : well, appears stated age [de-identified] : NCAT [de-identified] : soft, non distended. incarcerated RIH. no obvious LIH on exam

## 2023-02-14 NOTE — HISTORY OF PRESENT ILLNESS
[de-identified] : Mr. Mancuso is a very pleasant 56 y/o man who presents today for evaluation of right inguinal bulge. He is unsure of when it started. He reports a LIH that was repaired > 20 years ago. He has occasional mild discomfort on the left side without bulge or sig distress there. He denies pain on the right side. The bulge is always present on the right side. Denies constipation, bloating, n/v or difficulty tolerating diet. \par He is scheduled for robotic prostatectomy next week and interested in having the RIH addressed at that time.

## 2023-02-14 NOTE — PLAN
[FreeTextEntry1] : Overall he wishes to proceed and we will move forward with the combined surgery next week.

## 2023-02-16 LAB
PSA FREE FLD-MCNC: 19 %
PSA FREE SERPL-MCNC: 1.27 NG/ML
PSA SERPL-MCNC: 6.65 NG/ML

## 2023-02-17 ENCOUNTER — APPOINTMENT (OUTPATIENT)
Dept: UROLOGY | Facility: CLINIC | Age: 58
End: 2023-02-17
Payer: COMMERCIAL

## 2023-02-17 VITALS
TEMPERATURE: 97 F | OXYGEN SATURATION: 96 % | HEART RATE: 86 BPM | SYSTOLIC BLOOD PRESSURE: 120 MMHG | DIASTOLIC BLOOD PRESSURE: 74 MMHG

## 2023-02-17 LAB
ALBUMIN SERPL ELPH-MCNC: 4.5 G/DL
ALP BLD-CCNC: 71 U/L
ALT SERPL-CCNC: 19 U/L
ANION GAP SERPL CALC-SCNC: 13 MMOL/L
AST SERPL-CCNC: 13 U/L
BILIRUB DIRECT SERPL-MCNC: 0.1 MG/DL
BILIRUB INDIRECT SERPL-MCNC: 0.4 MG/DL
BILIRUB SERPL-MCNC: 0.5 MG/DL
BUN SERPL-MCNC: 11 MG/DL
CALCIUM SERPL-MCNC: 9.4 MG/DL
CHLORIDE SERPL-SCNC: 105 MMOL/L
CHOLEST SERPL-MCNC: 158 MG/DL
CO2 SERPL-SCNC: 25 MMOL/L
CREAT SERPL-MCNC: 0.84 MG/DL
EGFR: 100 ML/MIN/1.73M2
GLUCOSE SERPL-MCNC: 88 MG/DL
HDLC SERPL-MCNC: 39 MG/DL
LDLC SERPL CALC-MCNC: 97 MG/DL
NONHDLC SERPL-MCNC: 119 MG/DL
POTASSIUM SERPL-SCNC: 4.2 MMOL/L
PROT SERPL-MCNC: 7 G/DL
SODIUM SERPL-SCNC: 142 MMOL/L
TRIGL SERPL-MCNC: 111 MG/DL

## 2023-02-17 PROCEDURE — 99213 OFFICE O/P EST LOW 20 MIN: CPT

## 2023-02-17 RX ORDER — ELETRIPTAN HYDROBROMIDE 20 MG/1
20 TABLET, FILM COATED ORAL
Qty: 6 | Refills: 11 | Status: ACTIVE | COMMUNITY
Start: 2021-08-09 | End: 1900-01-01

## 2023-02-17 NOTE — HISTORY OF PRESENT ILLNESS
[FreeTextEntry1] : Dr. Garry Serrano\par Dr. Gloria Hector\par Dr. Dionisio Hilliard \par \par CC: Prostate Cancer\par \par Mr. Mancuso is a 57 year old  man with PMHx atrial fibrillation (2023) s/p pulmonary vein ablation and prostate cancer s/p cryoablation .\par Dr. Montenegro preformed focal cryoablation The target area right PZa was treated. The GG1 in LPB was not treated.\par He is no longer on anticoagulation\par \par \par PSA Hx:\par 3.58 22\par 3.13 22\par 3.14 22\par 2.74 10/04/21\par 2.56 21\par 2.64 20\par 2.12 20\par 2.65 3/09/20 \par 2. 6 19\par 2.5 19\par 2.9 18\par 4.1 18\par 4.99 17\par \par MRI 22 showed:\par 77 mL gland\par PIRADs 3 lesion Left anterior TZa\par PIRADs 3 lesion Right anterior PZa\par No LAD, No EPE, No bony lesions\par presumed post ablation changes noted\par \par Biopsy Hx\par 23\par  - LPB: [place] 3+3 involving 5%, 1 of 3 cores\par  - L mid TZa: [place] 3+4 involving 50% of 1 of 4 cores, pattern 4 = 10% of tumor\par  - Post-ablation MRI target: [place] 4+3 involving 30%, 90%, 40% of 3 of 6 cores, pattern 4 = 70% of tumor, perineural is identified\par \par History of left inguinal hernia repair, seen by Dr. 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

## 2023-02-17 NOTE — ASSESSMENT
[FreeTextEntry1] : Diagnosis: Prostate Cancer\par \par Plan:\par COVID PCR today\par \par We discussed expected side effects of robotic surgery including but not limited to scrotal edema and ecchymosis that may last for greater than 1 week, referred should pain post procedure, bladder spams while catheter is in place, intermittent hematuria, and constipation management.\par \par We discussed catheter.  He is aware that catheter will be in place for minimum of 7 days post operatively.  He will start jessica cath antibiotics 1 day prior to removal.  I demonstrated how to properly switch from a leg bag to overnight bag.  We also reviewed importance of applying stat lock to prevent catheter from puling.\par \par It is important that he is up and ambulating day for procedure which he verbalized an understanding of.  We discussed incision care and time line for showering.  No heavy lifting for the next 4 weeks post procedure.\par \par I recommend he start  Daily Tadalafil 5 mg after catheter removal\par \par Continue with Kegel exercises.  May resume physical therapy 4 weeks after procedure.\par \par Follow up 1 week after procedure for catheter removal \par \par

## 2023-02-21 ENCOUNTER — TRANSCRIPTION ENCOUNTER (OUTPATIENT)
Age: 58
End: 2023-02-21

## 2023-02-21 VITALS
SYSTOLIC BLOOD PRESSURE: 140 MMHG | DIASTOLIC BLOOD PRESSURE: 98 MMHG | HEIGHT: 67 IN | HEART RATE: 76 BPM | WEIGHT: 171.96 LBS | RESPIRATION RATE: 16 BRPM | TEMPERATURE: 97 F | OXYGEN SATURATION: 98 %

## 2023-02-21 LAB — SARS-COV-2 N GENE NPH QL NAA+PROBE: NOT DETECTED

## 2023-02-21 RX ORDER — TAMSULOSIN HYDROCHLORIDE 0.4 MG/1
1 CAPSULE ORAL
Qty: 0 | Refills: 0 | DISCHARGE

## 2023-02-21 NOTE — PATIENT PROFILE ADULT - STATED REASON FOR ADMISSION
multiport salvage robotic radical prostatectomy, possible open hernia repair, robotic assisted inguinal hernia repair

## 2023-02-21 NOTE — PATIENT PROFILE ADULT - FALL HARM RISK - UNIVERSAL INTERVENTIONS
Bed in lowest position, wheels locked, appropriate side rails in place/Call bell, personal items and telephone in reach/Instruct patient to call for assistance before getting out of bed or chair/Non-slip footwear when patient is out of bed/[place] to call system/Physically safe environment - no spills, clutter or unnecessary equipment/Purposeful Proactive Rounding/Room/bathroom lighting operational, light cord in reach

## 2023-02-22 ENCOUNTER — RESULT REVIEW (OUTPATIENT)
Age: 58
End: 2023-02-22

## 2023-02-22 ENCOUNTER — APPOINTMENT (OUTPATIENT)
Dept: SURGERY | Facility: HOSPITAL | Age: 58
End: 2023-02-22

## 2023-02-22 ENCOUNTER — INPATIENT (INPATIENT)
Facility: HOSPITAL | Age: 58
LOS: 1 days | Discharge: HOME | DRG: 707 | End: 2023-02-24
Attending: UROLOGY | Admitting: SURGERY
Payer: COMMERCIAL

## 2023-02-22 ENCOUNTER — TRANSCRIPTION ENCOUNTER (OUTPATIENT)
Age: 58
End: 2023-02-22

## 2023-02-22 ENCOUNTER — APPOINTMENT (OUTPATIENT)
Dept: UROLOGY | Facility: HOSPITAL | Age: 58
End: 2023-02-22

## 2023-02-22 DIAGNOSIS — Z98.890 OTHER SPECIFIED POSTPROCEDURAL STATES: Chronic | ICD-10-CM

## 2023-02-22 DIAGNOSIS — C61 MALIGNANT NEOPLASM OF PROSTATE: ICD-10-CM

## 2023-02-22 LAB
BLD GP AB SCN SERPL QL: NEGATIVE — SIGNIFICANT CHANGE UP
RH IG SCN BLD-IMP: POSITIVE — SIGNIFICANT CHANGE UP

## 2023-02-22 PROCEDURE — 88309 TISSUE EXAM BY PATHOLOGIST: CPT | Mod: 26

## 2023-02-22 PROCEDURE — S2900 ROBOTIC SURGICAL SYSTEM: CPT | Mod: NC

## 2023-02-22 PROCEDURE — 55866 LAPS SURG PRST8ECT RPBIC RAD: CPT

## 2023-02-22 PROCEDURE — 88331 PATH CONSLTJ SURG 1 BLK 1SPC: CPT | Mod: 26

## 2023-02-22 PROCEDURE — 38571 LAPAROSCOPY LYMPHADENECTOMY: CPT

## 2023-02-22 PROCEDURE — 49650 LAP ING HERNIA REPAIR INIT: CPT | Mod: RT

## 2023-02-22 PROCEDURE — 88305 TISSUE EXAM BY PATHOLOGIST: CPT | Mod: 26

## 2023-02-22 DEVICE — MESH HERNIA INGUINAL 3DMAX EXTRA LARGE 12.4 X 17.3CM RIGHT: Type: IMPLANTABLE DEVICE | Status: FUNCTIONAL

## 2023-02-22 RX ORDER — KETOROLAC TROMETHAMINE 30 MG/ML
15 SYRINGE (ML) INJECTION EVERY 8 HOURS
Refills: 0 | Status: DISCONTINUED | OUTPATIENT
Start: 2023-02-22 | End: 2023-02-24

## 2023-02-22 RX ORDER — ENOXAPARIN SODIUM 100 MG/ML
40 INJECTION SUBCUTANEOUS ONCE
Refills: 0 | Status: COMPLETED | OUTPATIENT
Start: 2023-02-22 | End: 2023-02-22

## 2023-02-22 RX ORDER — ACETAMINOPHEN 500 MG
1000 TABLET ORAL ONCE
Refills: 0 | Status: COMPLETED | OUTPATIENT
Start: 2023-02-22 | End: 2023-02-22

## 2023-02-22 RX ORDER — SODIUM CHLORIDE 9 MG/ML
1000 INJECTION, SOLUTION INTRAVENOUS
Refills: 0 | Status: DISCONTINUED | OUTPATIENT
Start: 2023-02-22 | End: 2023-02-22

## 2023-02-22 RX ORDER — ROSUVASTATIN CALCIUM 5 MG/1
1 TABLET ORAL
Qty: 0 | Refills: 0 | DISCHARGE

## 2023-02-22 RX ORDER — HYDROMORPHONE HYDROCHLORIDE 2 MG/ML
0.5 INJECTION INTRAMUSCULAR; INTRAVENOUS; SUBCUTANEOUS ONCE
Refills: 0 | Status: DISCONTINUED | OUTPATIENT
Start: 2023-02-22 | End: 2023-02-22

## 2023-02-22 RX ORDER — PANTOPRAZOLE SODIUM 20 MG/1
40 TABLET, DELAYED RELEASE ORAL
Refills: 0 | Status: DISCONTINUED | OUTPATIENT
Start: 2023-02-22 | End: 2023-02-24

## 2023-02-22 RX ORDER — CLONAZEPAM 1 MG
0.5 TABLET ORAL THREE TIMES A DAY
Refills: 0 | Status: DISCONTINUED | OUTPATIENT
Start: 2023-02-22 | End: 2023-02-24

## 2023-02-22 RX ORDER — ACETAMINOPHEN 500 MG
1000 TABLET ORAL ONCE
Refills: 0 | Status: DISCONTINUED | OUTPATIENT
Start: 2023-02-22 | End: 2023-02-22

## 2023-02-22 RX ORDER — CEFOTETAN DISODIUM 1 G
2 VIAL (EA) INJECTION EVERY 12 HOURS
Refills: 0 | Status: COMPLETED | OUTPATIENT
Start: 2023-02-23 | End: 2023-02-24

## 2023-02-22 RX ORDER — LIDOCAINE 4 G/100G
2 CREAM TOPICAL DAILY
Refills: 0 | Status: DISCONTINUED | OUTPATIENT
Start: 2023-02-22 | End: 2023-02-24

## 2023-02-22 RX ORDER — SODIUM CHLORIDE 9 MG/ML
1000 INJECTION INTRAMUSCULAR; INTRAVENOUS; SUBCUTANEOUS
Refills: 0 | Status: DISCONTINUED | OUTPATIENT
Start: 2023-02-22 | End: 2023-02-23

## 2023-02-22 RX ORDER — CLONAZEPAM 1 MG
1 TABLET ORAL
Qty: 0 | Refills: 0 | DISCHARGE

## 2023-02-22 RX ORDER — ACETAMINOPHEN 500 MG
650 TABLET ORAL EVERY 6 HOURS
Refills: 0 | Status: DISCONTINUED | OUTPATIENT
Start: 2023-02-22 | End: 2023-02-24

## 2023-02-22 RX ORDER — ESOMEPRAZOLE MAGNESIUM 40 MG/1
1 CAPSULE, DELAYED RELEASE ORAL
Qty: 0 | Refills: 0 | DISCHARGE

## 2023-02-22 RX ORDER — OXYCODONE HYDROCHLORIDE 5 MG/1
5 TABLET ORAL EVERY 6 HOURS
Refills: 0 | Status: DISCONTINUED | OUTPATIENT
Start: 2023-02-22 | End: 2023-02-24

## 2023-02-22 RX ORDER — ENOXAPARIN SODIUM 100 MG/ML
40 INJECTION SUBCUTANEOUS EVERY 24 HOURS
Refills: 0 | Status: DISCONTINUED | OUTPATIENT
Start: 2023-02-23 | End: 2023-02-24

## 2023-02-22 RX ORDER — SENNA PLUS 8.6 MG/1
2 TABLET ORAL AT BEDTIME
Refills: 0 | Status: DISCONTINUED | OUTPATIENT
Start: 2023-02-22 | End: 2023-02-24

## 2023-02-22 RX ORDER — ONDANSETRON 8 MG/1
4 TABLET, FILM COATED ORAL EVERY 6 HOURS
Refills: 0 | Status: DISCONTINUED | OUTPATIENT
Start: 2023-02-22 | End: 2023-02-24

## 2023-02-22 RX ORDER — ONDANSETRON 8 MG/1
4 TABLET, FILM COATED ORAL ONCE
Refills: 0 | Status: DISCONTINUED | OUTPATIENT
Start: 2023-02-22 | End: 2023-02-22

## 2023-02-22 RX ORDER — METOCLOPRAMIDE HCL 10 MG
10 TABLET ORAL ONCE
Refills: 0 | Status: COMPLETED | OUTPATIENT
Start: 2023-02-22 | End: 2023-02-22

## 2023-02-22 RX ORDER — ATORVASTATIN CALCIUM 80 MG/1
40 TABLET, FILM COATED ORAL AT BEDTIME
Refills: 0 | Status: DISCONTINUED | OUTPATIENT
Start: 2023-02-22 | End: 2023-02-24

## 2023-02-22 RX ORDER — OXYCODONE HYDROCHLORIDE 5 MG/1
5 TABLET ORAL ONCE
Refills: 0 | Status: DISCONTINUED | OUTPATIENT
Start: 2023-02-22 | End: 2023-02-22

## 2023-02-22 RX ORDER — ACETAMINOPHEN 500 MG
650 TABLET ORAL EVERY 6 HOURS
Refills: 0 | Status: DISCONTINUED | OUTPATIENT
Start: 2023-02-22 | End: 2023-02-22

## 2023-02-22 RX ADMIN — Medication 1000 MILLIGRAM(S): at 12:28

## 2023-02-22 RX ADMIN — LIDOCAINE 2 PATCH: 4 CREAM TOPICAL at 22:30

## 2023-02-22 RX ADMIN — ENOXAPARIN SODIUM 40 MILLIGRAM(S): 100 INJECTION SUBCUTANEOUS at 12:28

## 2023-02-22 RX ADMIN — ONDANSETRON 4 MILLIGRAM(S): 8 TABLET, FILM COATED ORAL at 22:31

## 2023-02-22 RX ADMIN — Medication 10 MILLIGRAM(S): at 23:07

## 2023-02-22 NOTE — H&P ADULT - ASSESSMENT
Prostate cancer (185) (C61)    Diagnosis: Prostate Cancer    Plan:  COVID PCR today    We discussed expected side effects of robotic surgery including but not limited to scrotal edema and ecchymosis that may last for greater than 1 week, referred should pain post procedure, bladder spams while catheter is in place, intermittent hematuria, and constipation management.    We discussed catheter. He is aware that catheter will be in place for minimum of 7 days post operatively. He will start jessica cath antibiotics 1 day prior to removal. I demonstrated how to properly switch from a leg bag to overnight bag. We also reviewed importance of applying stat lock to prevent catheter from puling.    It is important that he is up and ambulating day for procedure which he verbalized an understanding of. We discussed incision care and time line for showering. No heavy lifting for the next 4 weeks post procedure.    I recommend he start Daily Tadalafil 5 mg after catheter removal    Continue with Kegel exercises. May resume physical therapy 4 weeks after procedure.    Follow up 1 week after procedure for catheter removal

## 2023-02-22 NOTE — CHART NOTE - NSCHARTNOTEFT_GEN_A_CORE
Procedure: RA lap R inguinal hernia repair   Surgeon: Arminda     S: Pt seen in PACU.  Pt complains of nausea. Asking for additional antiemetics, which have been ordered by urology team.  Pain controlled with medication. Has not yet ambulated, passed gas, or had a bowel movement since surgery. Denies CP, SOB, WEEMS, calf tenderness.   Lisa in place     O:  T(C): 36.1 (02-22-23 @ 21:39), Max: 36.1 (02-22-23 @ 21:39)  T(F): 97 (02-22-23 @ 21:39), Max: 97 (02-22-23 @ 21:39)  HR: 72 (02-22-23 @ 22:54) (63 - 75)  BP: 112/72 (02-22-23 @ 22:54) (90/52 - 112/72)  RR: 14 (02-22-23 @ 22:54) (10 - 21)  SpO2: 95% (02-22-23 @ 22:54) (92% - 96%)  Wt(kg): --            Gen: Awake, Alert, NAD  C/V: NSR  Pulm: Nonlabored breathing, no respiratory distress  Abd: soft, non-distended, appropriately tender to palpation, no rebound/guarding, incisions c/d/i   Extrem: WWP  Lisa in place       A/P: 57yMale s/p Robotic assisted Right inguinal hernia repair (gen surg) and RA prostatectomy with pelvic lymph node dissection (urology) 2/22.     - Care per primary team   - Gen surg team 4c to continue to follow

## 2023-02-22 NOTE — BRIEF OPERATIVE NOTE - SPECIMENS
Prostate and seminal vesicles; bilateral pelvic lymph nodes; right lateral apical margin; right apical margin
none

## 2023-02-22 NOTE — BRIEF OPERATIVE NOTE - NSICDXBRIEFPOSTOP_GEN_ALL_CORE_FT
POST-OP DIAGNOSIS:  Prostate cancer 22-Feb-2023 21:13:21  Abner Romano  
POST-OP DIAGNOSIS:  Direct inguinal hernia of right side 22-Feb-2023 20:47:27  Tila Barclay  Indirect right inguinal hernia 22-Feb-2023 20:47:40  Tila Barclay  Obturator hernia 22-Feb-2023 20:47:48 right Tila Barclay

## 2023-02-22 NOTE — BRIEF OPERATIVE NOTE - NSICDXBRIEFPREOP_GEN_ALL_CORE_FT
PRE-OP DIAGNOSIS:  Prostate cancer 22-Feb-2023 21:13:12  Abner Romano  
PRE-OP DIAGNOSIS:  Right inguinal hernia 22-Feb-2023 20:47:17  Tila Barclay

## 2023-02-22 NOTE — BRIEF OPERATIVE NOTE - NSICDXBRIEFPROCEDURE_GEN_ALL_CORE_FT
PROCEDURES:  Robotic-assisted prostatectomy with pelvic lymph node dissection 22-Feb-2023 21:13:00  Abner Romano  
PROCEDURES:  Robot-assisted laparoscopic herniorrhaphy of right inguinal hernia 22-Feb-2023 20:47:06  Tila Barclay

## 2023-02-22 NOTE — BRIEF OPERATIVE NOTE - OPERATION/FINDINGS
Multiport robotic transperitoneal (salvage from focal cryo) radical prostatectomy with pelvic lymphadenectomy
Abdomen accessed following Veress needle insufflation at umbilicus. Robot docked. Urology proceeded with radical prostatectomy (please refer to their note for their portion of the procedure)- reflecting the bladder down following anterior peritoneal flap creation. Direct, indirect, and obturator hernias noted on the right. Previous scarred left indirect and obturator hernia appreciated. Hernia sac on the right dissected off cord and peritoneal flaps further developed without tension. Extra large 3D Bard Mesh placed and secured laterally with vicryl x1 and medially with vicryl x1. Peritoneal flaps reapproximated with running fast absorbable 3-0 V lock x3. Please refer to Urology's note for the remainder of the procedure.

## 2023-02-22 NOTE — H&P ADULT - HISTORY OF PRESENT ILLNESS
Mr. Mancuso is a 57 year old  man with PMHx atrial fibrillation (2023) s/p pulmonary vein ablation and prostate cancer s/p cryoablation .  Dr. Montenegro preformed focal cryoablation The target area right PZa was treated. The GG1 in LPB was not treated.  He is no longer on anticoagulation      PSA Hx:  3.58 22  3.13 22  3.14 22  2.74 10/04/21  2.56 21  2.64 20  2.12 20  2.65 3/09/20   2. 6 19  2.5 19  2.9 18  4.1 18  4.99 17    MRI 22 showed:  77 mL gland  PIRADs 3 lesion Left anterior TZa  PIRADs 3 lesion Right anterior PZa  No LAD, No EPE, No bony lesions  presumed post ablation changes noted    Biopsy Hx  23   - LPB: Elba 3+3 involving 5%, 1 of 3 cores   - L mid TZa: Elba 3+4 involving 50% of 1 of 4 cores, pattern 4 = 10% of tumor   - Post-ablation MRI target: Mejia 4+3 involving 30%, 90%, 40% of 3 of 6 cores, pattern 4 = 70% of tumor, perineural is identified    History of left inguinal hernia repair, seen by Dr. 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[employer], retired a year ago, , no children, non smoker   SURGICAL: Focal cryo prostate, anterior C4-5 disc surgery, pulmonary vein ablation, left inguinal hernia with mesh       Active Problems  Unreviewed Unverified: Gastro-esophageal reflux (530.81) (K21.9)  Unreviewed Unverified: Hernia (553.9) (K46.9)  Anxiety (300.00) (F41.9)  Bleeding hemorrhoids (455.8) (K64.9)  BPH with obstruction/lower urinary tract symptoms (600.01,599.69) (N40.1,N13.8)  Chest pain (786.50) (R07.9)  Dry cough (786.2) (R05.8)  Elevated blood pressure reading (796.2) (R03.0)  Encounter for laboratory testing for COVID-19 virus (V01.79) (Z20.822)  Fluttering sensation of heart (785.1) (R00.2)  GERD without esophagitis (530.81) (K21.9)  High cholesterol (272.0) (E78.00)  History of exposure to asbestos (V15.84) (Z77.090)  History of kidney stones (V13.01) (Z87.442)  Hyperlipidemia, unspecified hyperlipidemia type (272.4) (E78.5)  Iron excess (275.09) (E83.19)  Migraine without aura and without status migrainosus, not intractable (346.10) (G43.009)  Need for hepatitis B screening test (V73.89) (Z11.59)  Paroxysmal atrial fibrillation (427.31) (I48.0)  Possible exposure to STD (V01.6) (Z20.2)  Pre-diabetes (790.29) (R73.03)  Preoperative cardiovascular examination (V72.81) (Z01.810)  Preoperative clearance (V72.84) (Z01.818)  Preoperative general physical examination (V72.83) (Z01.818)  Prostate cancer (185) (C61)  Rectal pressure (787.99) (R19.8)  Recurrent UTI (599.0) (N39.0)  Red blood cell abnormality (790.09) (R71.8)  Strain of groin, left, initial encounter (848.8) (S76.212A)    Past Medical History  History of Costochondritis (733.6) (M94.0)  H/O cardiovascular stress test (V15.89) (Z92.89)  H/O echocardiogram (V15.89) (Z92.89)  History of colonoscopy (V45.89) (Z98.890)  History of Holter monitoring (V45.89) (Z98.890)  History of malignant neoplasm of prostate (V10.46) (Z85.46)  History of Inguinal hernia (550.90) (K40.90)  Personal history of urinary (tract) infections (V13.02) (Z87.440)    Surgical History  Unreviewed Unverified: History of Pulmonary Valve Repair  History of Cervical discectomy  History of Inguinal hernia repair  History of Prostate surgery  History of Tonsillectomy with adenoidectomy    Family History  Family history of  : Father  Family history of asthma (V17.5) (Z82.5) : Father  Family history of malignant neoplasm (V16.9) (Z80.9) : Mother  Family history of malignant neoplasm of breast (V16.3) (Z80.3) : Sister  Family history of pulmonary embolism (V17.49) (Z82.49) : Father    Social History  Currently works full-time  Has no children  Never a smoker  No illicit drug use  Single  Social alcohol use (V49.89) (Z78.9)    Current Meds  clonazePAM 0.5 MG Oral Tablet; TAKE 1 TABLET DAILY AS NEEDED FOR ANXIETY.  MDD:0.5mg  Eletriptan Hydrobromide 20 MG Oral Tablet; Take 1 tablet as needed one time Orally  Once a day  Rosuvastatin Calcium 10 MG Oral Tablet; TAKE 1 TABLET BY MOUTH DAILY    Allergies  No Known Drug Allergies  Animal dander - Cats    Review of Systems    Genitourinary: see HPI.   Constitutional, Cardiovascular, Respiratory, Gastrointestinal, Musculoskeletal, Integumentary, Neurological, Psychiatric, Endocrine and Heme/Lymph are otherwise negative.

## 2023-02-22 NOTE — PRE-ANESTHESIA EVALUATION ADULT - NSANTHPMHFT_GEN_ALL_CORE
Per patient and partner, h/o pAfib s/p ablation 1 year ago. No atrial fibrillation since. Otherwise, h/o anxiety, migraines, and GERD (controlled). H/o cervical spine surgery, but no numbness or weakness. METS > 4. No CP/SOB/N/V/GERD. All other conditions stable.

## 2023-02-22 NOTE — PRE-ANESTHESIA EVALUATION ADULT - NSPROPOSEDPROCEDFT_GEN_ALL_CORE
Robot assisted multiport salvage radical prostatectomy, possible open versus robotic assisted inguinal hernia repair

## 2023-02-23 ENCOUNTER — TRANSCRIPTION ENCOUNTER (OUTPATIENT)
Age: 58
End: 2023-02-23

## 2023-02-23 LAB
ANION GAP SERPL CALC-SCNC: 10 MMOL/L — SIGNIFICANT CHANGE UP (ref 5–17)
BUN SERPL-MCNC: 17 MG/DL — SIGNIFICANT CHANGE UP (ref 7–23)
CALCIUM SERPL-MCNC: 8.4 MG/DL — SIGNIFICANT CHANGE UP (ref 8.4–10.5)
CHLORIDE SERPL-SCNC: 104 MMOL/L — SIGNIFICANT CHANGE UP (ref 96–108)
CO2 SERPL-SCNC: 25 MMOL/L — SIGNIFICANT CHANGE UP (ref 22–31)
CREAT SERPL-MCNC: 0.99 MG/DL — SIGNIFICANT CHANGE UP (ref 0.5–1.3)
EGFR: 88 ML/MIN/1.73M2 — SIGNIFICANT CHANGE UP
GLUCOSE SERPL-MCNC: 161 MG/DL — HIGH (ref 70–99)
HCT VFR BLD CALC: 41.9 % — SIGNIFICANT CHANGE UP (ref 39–50)
HCV AB S/CO SERPL IA: 0.04 S/CO — SIGNIFICANT CHANGE UP
HCV AB SERPL-IMP: SIGNIFICANT CHANGE UP
HGB BLD-MCNC: 13.4 G/DL — SIGNIFICANT CHANGE UP (ref 13–17)
MAGNESIUM SERPL-MCNC: 1.8 MG/DL — SIGNIFICANT CHANGE UP (ref 1.6–2.6)
MCHC RBC-ENTMCNC: 29.8 PG — SIGNIFICANT CHANGE UP (ref 27–34)
MCHC RBC-ENTMCNC: 32 GM/DL — SIGNIFICANT CHANGE UP (ref 32–36)
MCV RBC AUTO: 93.3 FL — SIGNIFICANT CHANGE UP (ref 80–100)
NRBC # BLD: 0 /100 WBCS — SIGNIFICANT CHANGE UP (ref 0–0)
PHOSPHATE SERPL-MCNC: 4.3 MG/DL — SIGNIFICANT CHANGE UP (ref 2.5–4.5)
PLATELET # BLD AUTO: 177 K/UL — SIGNIFICANT CHANGE UP (ref 150–400)
POTASSIUM SERPL-MCNC: 5.1 MMOL/L — SIGNIFICANT CHANGE UP (ref 3.5–5.3)
POTASSIUM SERPL-SCNC: 5.1 MMOL/L — SIGNIFICANT CHANGE UP (ref 3.5–5.3)
RBC # BLD: 4.49 M/UL — SIGNIFICANT CHANGE UP (ref 4.2–5.8)
RBC # FLD: 13 % — SIGNIFICANT CHANGE UP (ref 10.3–14.5)
SODIUM SERPL-SCNC: 139 MMOL/L — SIGNIFICANT CHANGE UP (ref 135–145)
WBC # BLD: 13.83 K/UL — HIGH (ref 3.8–10.5)
WBC # FLD AUTO: 13.83 K/UL — HIGH (ref 3.8–10.5)

## 2023-02-23 RX ORDER — SCOPALAMINE 1 MG/3D
1 PATCH, EXTENDED RELEASE TRANSDERMAL ONCE
Refills: 0 | Status: COMPLETED | OUTPATIENT
Start: 2023-02-23 | End: 2023-02-23

## 2023-02-23 RX ORDER — KETOROLAC TROMETHAMINE 30 MG/ML
15 SYRINGE (ML) INJECTION ONCE
Refills: 0 | Status: DISCONTINUED | OUTPATIENT
Start: 2023-02-23 | End: 2023-02-23

## 2023-02-23 RX ADMIN — OXYCODONE HYDROCHLORIDE 5 MILLIGRAM(S): 5 TABLET ORAL at 21:25

## 2023-02-23 RX ADMIN — Medication 650 MILLIGRAM(S): at 06:01

## 2023-02-23 RX ADMIN — OXYCODONE HYDROCHLORIDE 5 MILLIGRAM(S): 5 TABLET ORAL at 20:25

## 2023-02-23 RX ADMIN — Medication 650 MILLIGRAM(S): at 05:01

## 2023-02-23 RX ADMIN — Medication 650 MILLIGRAM(S): at 14:00

## 2023-02-23 RX ADMIN — Medication 650 MILLIGRAM(S): at 19:03

## 2023-02-23 RX ADMIN — Medication 15 MILLIGRAM(S): at 11:10

## 2023-02-23 RX ADMIN — Medication 650 MILLIGRAM(S): at 00:05

## 2023-02-23 RX ADMIN — OXYCODONE HYDROCHLORIDE 5 MILLIGRAM(S): 5 TABLET ORAL at 08:35

## 2023-02-23 RX ADMIN — Medication 650 MILLIGRAM(S): at 20:44

## 2023-02-23 RX ADMIN — Medication 650 MILLIGRAM(S): at 01:05

## 2023-02-23 RX ADMIN — Medication 15 MILLIGRAM(S): at 10:53

## 2023-02-23 RX ADMIN — Medication 100 GRAM(S): at 14:00

## 2023-02-23 RX ADMIN — Medication 650 MILLIGRAM(S): at 15:00

## 2023-02-23 RX ADMIN — Medication 15 MILLIGRAM(S): at 05:15

## 2023-02-23 RX ADMIN — ATORVASTATIN CALCIUM 40 MILLIGRAM(S): 80 TABLET, FILM COATED ORAL at 22:01

## 2023-02-23 RX ADMIN — OXYCODONE HYDROCHLORIDE 5 MILLIGRAM(S): 5 TABLET ORAL at 07:35

## 2023-02-23 RX ADMIN — SENNA PLUS 2 TABLET(S): 8.6 TABLET ORAL at 22:01

## 2023-02-23 RX ADMIN — LIDOCAINE 2 PATCH: 4 CREAM TOPICAL at 14:01

## 2023-02-23 RX ADMIN — Medication 5 MILLIGRAM(S): at 19:03

## 2023-02-23 RX ADMIN — SODIUM CHLORIDE 120 MILLILITER(S): 9 INJECTION INTRAMUSCULAR; INTRAVENOUS; SUBCUTANEOUS at 00:05

## 2023-02-23 RX ADMIN — Medication 15 MILLIGRAM(S): at 20:44

## 2023-02-23 RX ADMIN — Medication 100 GRAM(S): at 02:09

## 2023-02-23 RX ADMIN — ENOXAPARIN SODIUM 40 MILLIGRAM(S): 100 INJECTION SUBCUTANEOUS at 14:01

## 2023-02-23 RX ADMIN — Medication 15 MILLIGRAM(S): at 19:04

## 2023-02-23 RX ADMIN — LIDOCAINE 2 PATCH: 4 CREAM TOPICAL at 12:19

## 2023-02-23 RX ADMIN — SCOPALAMINE 1 PATCH: 1 PATCH, EXTENDED RELEASE TRANSDERMAL at 00:42

## 2023-02-23 RX ADMIN — SCOPALAMINE 1 PATCH: 1 PATCH, EXTENDED RELEASE TRANSDERMAL at 07:08

## 2023-02-23 RX ADMIN — LIDOCAINE 2 PATCH: 4 CREAM TOPICAL at 07:08

## 2023-02-23 RX ADMIN — PANTOPRAZOLE SODIUM 40 MILLIGRAM(S): 20 TABLET, DELAYED RELEASE ORAL at 05:01

## 2023-02-23 RX ADMIN — Medication 15 MILLIGRAM(S): at 05:00

## 2023-02-23 RX ADMIN — LIDOCAINE 2 PATCH: 4 CREAM TOPICAL at 19:45

## 2023-02-23 NOTE — DISCHARGE NOTE PROVIDER - NSDCFUSCHEDAPPT_GEN_ALL_CORE_FT
Mike Berg  [facility]  UROLOGY [address]  Scheduled Appointment: 03/02/2023    John Shrestha  [facility]  HEARTVASC [address]  Scheduled Appointment: 04/14/2023

## 2023-02-23 NOTE — DISCHARGE NOTE PROVIDER - CARE PROVIDER_API CALL
Mike Berg)  Urology  [address]  [place], [place] [id number]  Phone: [phone]  Fax: [phone]  Follow Up Time:

## 2023-02-23 NOTE — DISCHARGE NOTE PROVIDER - NSDCFUADDINST_GEN_ALL_CORE_FT
You may disconnect your walker catheter from the drainage bag, and let it drain freely while showering.  Make sure your incision sites are clean and dry after showering, it is not necessary to scrub, just let water and soap wash over incision sites.     Advance diet as tolerated, activity as tolerated. No heavy lifting or straining. Walker to leg bag, Stay well hydrated. If fever >100.4 or any change or worsening of symptoms please call doctor or report to ED. Make follow up appointment with Dr Berg in 2 weeks.

## 2023-02-23 NOTE — DISCHARGE NOTE PROVIDER - NSDCCPCAREPLAN_GEN_ALL_CORE_FT
PRINCIPAL DISCHARGE DIAGNOSIS  Diagnosis: Prostate cancer  Assessment and Plan of Treatment:       SECONDARY DISCHARGE DIAGNOSES  Diagnosis: Hyperlipidemia  Assessment and Plan of Treatment:

## 2023-02-23 NOTE — DISCHARGE NOTE PROVIDER - NSDCMRMEDTOKEN_GEN_ALL_CORE_FT
amoxicillin-clavulanate 500 mg-125 mg oral tablet: 1 tab(s) orally 2 times a day MDD:Please take one tab twice a day for three days. Please start day before walker catheter removal.  clonazePAM 0.5 mg oral tablet: 1 tab(s) orally 3 times a day, As Needed  NexIUM 24HR 20 mg oral delayed release tablet: 1 tab(s) orally once a day (in the morning)  rosuvastatin 10 mg oral tablet: 1 tab(s) orally once a day   amoxicillin-clavulanate 500 mg-125 mg oral tablet: 1 tab(s) orally 2 times a day MDD:Please take one tab twice a day for three days. Please start day before walker catheter removal.  clonazePAM 0.5 mg oral tablet: 1 tab(s) orally 3 times a day, As Needed  NexIUM 24HR 20 mg oral delayed release tablet: 1 tab(s) orally once a day (in the morning)  oxyCODONE 5 mg oral capsule: 1 cap(s) orally every 6 hours MDD:Please take one tab every 6 hours as needed for moderate pain  rosuvastatin 10 mg oral tablet: 1 tab(s) orally once a day

## 2023-02-24 ENCOUNTER — TRANSCRIPTION ENCOUNTER (OUTPATIENT)
Age: 58
End: 2023-02-24

## 2023-02-24 ENCOUNTER — NON-APPOINTMENT (OUTPATIENT)
Age: 58
End: 2023-02-24

## 2023-02-24 VITALS
TEMPERATURE: 98 F | HEART RATE: 65 BPM | SYSTOLIC BLOOD PRESSURE: 116 MMHG | RESPIRATION RATE: 17 BRPM | DIASTOLIC BLOOD PRESSURE: 78 MMHG | OXYGEN SATURATION: 96 %

## 2023-02-24 LAB
ANION GAP SERPL CALC-SCNC: 8 MMOL/L — SIGNIFICANT CHANGE UP (ref 5–17)
BUN SERPL-MCNC: 15 MG/DL — SIGNIFICANT CHANGE UP (ref 7–23)
CALCIUM SERPL-MCNC: 8.6 MG/DL — SIGNIFICANT CHANGE UP (ref 8.4–10.5)
CHLORIDE SERPL-SCNC: 106 MMOL/L — SIGNIFICANT CHANGE UP (ref 96–108)
CO2 SERPL-SCNC: 27 MMOL/L — SIGNIFICANT CHANGE UP (ref 22–31)
CREAT SERPL-MCNC: 0.91 MG/DL — SIGNIFICANT CHANGE UP (ref 0.5–1.3)
EGFR: 97 ML/MIN/1.73M2 — SIGNIFICANT CHANGE UP
GLUCOSE SERPL-MCNC: 106 MG/DL — HIGH (ref 70–99)
HCT VFR BLD CALC: 39.8 % — SIGNIFICANT CHANGE UP (ref 39–50)
HGB BLD-MCNC: 12.7 G/DL — LOW (ref 13–17)
MAGNESIUM SERPL-MCNC: 2.2 MG/DL — SIGNIFICANT CHANGE UP (ref 1.6–2.6)
MCHC RBC-ENTMCNC: 30.2 PG — SIGNIFICANT CHANGE UP (ref 27–34)
MCHC RBC-ENTMCNC: 31.9 GM/DL — LOW (ref 32–36)
MCV RBC AUTO: 94.5 FL — SIGNIFICANT CHANGE UP (ref 80–100)
NRBC # BLD: 0 /100 WBCS — SIGNIFICANT CHANGE UP (ref 0–0)
PHOSPHATE SERPL-MCNC: 2.5 MG/DL — SIGNIFICANT CHANGE UP (ref 2.5–4.5)
PLATELET # BLD AUTO: 152 K/UL — SIGNIFICANT CHANGE UP (ref 150–400)
POTASSIUM SERPL-MCNC: 4.3 MMOL/L — SIGNIFICANT CHANGE UP (ref 3.5–5.3)
POTASSIUM SERPL-SCNC: 4.3 MMOL/L — SIGNIFICANT CHANGE UP (ref 3.5–5.3)
RBC # BLD: 4.21 M/UL — SIGNIFICANT CHANGE UP (ref 4.2–5.8)
RBC # FLD: 13.7 % — SIGNIFICANT CHANGE UP (ref 10.3–14.5)
SODIUM SERPL-SCNC: 141 MMOL/L — SIGNIFICANT CHANGE UP (ref 135–145)
WBC # BLD: 8.06 K/UL — SIGNIFICANT CHANGE UP (ref 3.8–10.5)
WBC # FLD AUTO: 8.06 K/UL — SIGNIFICANT CHANGE UP (ref 3.8–10.5)

## 2023-02-24 PROCEDURE — 88331 PATH CONSLTJ SURG 1 BLK 1SPC: CPT

## 2023-02-24 PROCEDURE — 86850 RBC ANTIBODY SCREEN: CPT

## 2023-02-24 PROCEDURE — C1781: CPT

## 2023-02-24 PROCEDURE — 36415 COLL VENOUS BLD VENIPUNCTURE: CPT

## 2023-02-24 PROCEDURE — 83735 ASSAY OF MAGNESIUM: CPT

## 2023-02-24 PROCEDURE — 86901 BLOOD TYPING SEROLOGIC RH(D): CPT

## 2023-02-24 PROCEDURE — 86900 BLOOD TYPING SEROLOGIC ABO: CPT

## 2023-02-24 PROCEDURE — 88309 TISSUE EXAM BY PATHOLOGIST: CPT

## 2023-02-24 PROCEDURE — 86803 HEPATITIS C AB TEST: CPT

## 2023-02-24 PROCEDURE — 80048 BASIC METABOLIC PNL TOTAL CA: CPT

## 2023-02-24 PROCEDURE — S2900: CPT

## 2023-02-24 PROCEDURE — 85027 COMPLETE CBC AUTOMATED: CPT

## 2023-02-24 PROCEDURE — 84100 ASSAY OF PHOSPHORUS: CPT

## 2023-02-24 PROCEDURE — 88305 TISSUE EXAM BY PATHOLOGIST: CPT

## 2023-02-24 RX ORDER — OXYCODONE HYDROCHLORIDE 5 MG/1
1 TABLET ORAL
Qty: 8 | Refills: 0
Start: 2023-02-24

## 2023-02-24 RX ADMIN — OXYCODONE HYDROCHLORIDE 5 MILLIGRAM(S): 5 TABLET ORAL at 05:47

## 2023-02-24 RX ADMIN — Medication 650 MILLIGRAM(S): at 07:20

## 2023-02-24 RX ADMIN — OXYCODONE HYDROCHLORIDE 5 MILLIGRAM(S): 5 TABLET ORAL at 10:50

## 2023-02-24 RX ADMIN — Medication 15 MILLIGRAM(S): at 10:32

## 2023-02-24 RX ADMIN — LIDOCAINE 2 PATCH: 4 CREAM TOPICAL at 11:09

## 2023-02-24 RX ADMIN — SCOPALAMINE 1 PATCH: 1 PATCH, EXTENDED RELEASE TRANSDERMAL at 06:42

## 2023-02-24 RX ADMIN — Medication 15 MILLIGRAM(S): at 10:18

## 2023-02-24 RX ADMIN — Medication 15 MILLIGRAM(S): at 02:32

## 2023-02-24 RX ADMIN — Medication 650 MILLIGRAM(S): at 03:17

## 2023-02-24 RX ADMIN — Medication 100 GRAM(S): at 02:16

## 2023-02-24 RX ADMIN — OXYCODONE HYDROCHLORIDE 5 MILLIGRAM(S): 5 TABLET ORAL at 11:50

## 2023-02-24 RX ADMIN — Medication 650 MILLIGRAM(S): at 12:07

## 2023-02-24 RX ADMIN — Medication 15 MILLIGRAM(S): at 02:17

## 2023-02-24 RX ADMIN — LIDOCAINE 2 PATCH: 4 CREAM TOPICAL at 02:30

## 2023-02-24 RX ADMIN — OXYCODONE HYDROCHLORIDE 5 MILLIGRAM(S): 5 TABLET ORAL at 04:47

## 2023-02-24 RX ADMIN — PANTOPRAZOLE SODIUM 40 MILLIGRAM(S): 20 TABLET, DELAYED RELEASE ORAL at 07:20

## 2023-02-24 RX ADMIN — Medication 650 MILLIGRAM(S): at 11:07

## 2023-02-24 RX ADMIN — Medication 650 MILLIGRAM(S): at 02:17

## 2023-02-24 NOTE — PROGRESS NOTE ADULT - PROBLEM SELECTOR PLAN 1
- s/p RALP   - continue walker catheter   - monitor urine output   - diet clears   - abx: Cefotetan   - OOB/IS   - SCDs   - continue home medications
- s/p RALP   - continue walker catheter   - monitor urine output   - diet clears   - abx: Cefotetan   - OOB/IS   - SCDs   - continue home medications.
-stable  -OOB  -SCD's, IS  -f/u labs  -surgery f/u  -d/c planning, home with walker/leg bag

## 2023-02-24 NOTE — PROGRESS NOTE ADULT - ASSESSMENT
57yMale s/p Robotic assisted Right inguinal hernia repair (gen surg) and RA prostatectomy with pelvic lymph node dissection (urology) 2/22.     Recommendations:   Pain/nausea control, continue bowel regimen.   Monitor for return of bowel function before advancing diet.  Rest of care per primary.  Team 4C surgery will continue to follow   
58yo male with PMH of prostate cancer, right inguinal hernia, a.fib (s/p ablation 06/22, no AC), hyperlipidemia, prediabetes s/p RALP and Robot-assisted laparoscopic herniorrhaphy of right inguinal hernia by general surgery POD#0. 
56yo male with PMH of prostate cancer, right inguinal hernia, a.fib (s/p ablation 06/22, no AC), hyperlipidemia, prediabetes s/p RALP and Robot-assisted laparoscopic herniorrhaphy of right inguinal hernia by general surgery POD#1. 
57yMale s/p Robotic assisted Right inguinal hernia repair (gen surg) and RA prostatectomy with pelvic lymph node dissection (urology) 2/22.     Recommendations:   Pain/nausea control, continue bowel regimen.   Upon discharge, will coordinate to have patient follow up with Dr. Hilliard, when he comes to see Dr. Berg in office  Team 4C surgery will continue to follow   
58yo male with PMH of prostate cancer, right inguinal hernia, a.fib (s/p ablation 06/22, no AC), hyperlipidemia, prediabetes s/p RALP and Robot-assisted laparoscopic herniorrhaphy of right inguinal hernia by general surgery POD#2.

## 2023-02-24 NOTE — DISCHARGE NOTE NURSING/CASE MANAGEMENT/SOCIAL WORK - NSDCPEFALRISK_GEN_ALL_CORE
For information on Fall & Injury Prevention, visit: [email or web address] OR  [email or web address] OR  https://www.cdc.gov/steadi/patient.html

## 2023-02-24 NOTE — DISCHARGE NOTE NURSING/CASE MANAGEMENT/SOCIAL WORK - PATIENT PORTAL LINK FT
You can access the FollowMyHealth Patient Portal offered by [facility] by registering at the following website: [email or web address]. By joining [facility] FollowMyHealth portal, you will also be able to view your health information using other applications (apps) compatible with our system.

## 2023-02-27 ENCOUNTER — NON-APPOINTMENT (OUTPATIENT)
Age: 58
End: 2023-02-27

## 2023-02-27 LAB — SURGICAL PATHOLOGY STUDY: SIGNIFICANT CHANGE UP

## 2023-03-01 LAB
ALBUMIN SERPL ELPH-MCNC: 4.7 G/DL
ALP BLD-CCNC: 74 U/L
ALT SERPL-CCNC: 16 U/L
ANION GAP SERPL CALC-SCNC: 14 MMOL/L
APTT BLD: 29.8 SEC
AST SERPL-CCNC: 11 U/L
BACTERIA UR CULT: NORMAL
BASOPHILS # BLD AUTO: 0.03 K/UL
BASOPHILS NFR BLD AUTO: 0.5 %
BILIRUB SERPL-MCNC: 0.4 MG/DL
BUN SERPL-MCNC: 9 MG/DL
CALCIUM SERPL-MCNC: 9.8 MG/DL
CHLORIDE SERPL-SCNC: 109 MMOL/L
CO2 SERPL-SCNC: 24 MMOL/L
CREAT SERPL-MCNC: 0.89 MG/DL
EGFR: 99 ML/MIN/1.73M2
EOSINOPHIL # BLD AUTO: 0.17 K/UL
EOSINOPHIL NFR BLD AUTO: 2.8 %
GLUCOSE SERPL-MCNC: 107 MG/DL
HCT VFR BLD CALC: 48.6 %
HGB BLD-MCNC: 15.1 G/DL
IMM GRANULOCYTES NFR BLD AUTO: 0.3 %
INR PPP: 1.01 RATIO
LYMPHOCYTES # BLD AUTO: 1.22 K/UL
LYMPHOCYTES NFR BLD AUTO: 20 %
MAN DIFF?: NORMAL
MCHC RBC-ENTMCNC: 29.5 PG
MCHC RBC-ENTMCNC: 31.1 GM/DL
MCV RBC AUTO: 95.1 FL
MONOCYTES # BLD AUTO: 0.49 K/UL
MONOCYTES NFR BLD AUTO: 8 %
NEUTROPHILS # BLD AUTO: 4.16 K/UL
NEUTROPHILS NFR BLD AUTO: 68.4 %
PLATELET # BLD AUTO: 207 K/UL
POTASSIUM SERPL-SCNC: 4.4 MMOL/L
PROT SERPL-MCNC: 6.9 G/DL
PT BLD: 11.9 SEC
RBC # BLD: 5.11 M/UL
RBC # FLD: 13.6 %
SODIUM SERPL-SCNC: 147 MMOL/L
WBC # FLD AUTO: 6.09 K/UL

## 2023-03-02 ENCOUNTER — APPOINTMENT (OUTPATIENT)
Dept: UROLOGY | Facility: CLINIC | Age: 58
End: 2023-03-02
Payer: COMMERCIAL

## 2023-03-02 VITALS
OXYGEN SATURATION: 97 % | DIASTOLIC BLOOD PRESSURE: 88 MMHG | TEMPERATURE: 96.8 F | HEART RATE: 65 BPM | SYSTOLIC BLOOD PRESSURE: 130 MMHG

## 2023-03-02 PROBLEM — C61 MALIGNANT NEOPLASM OF PROSTATE: Chronic | Status: ACTIVE | Noted: 2023-02-21

## 2023-03-02 PROCEDURE — 99024 POSTOP FOLLOW-UP VISIT: CPT

## 2023-03-02 NOTE — HISTORY OF PRESENT ILLNESS
[FreeTextEntry1] : Dr. Garry Serrano\par Dr. Gloria Hector\par Dr. Dionisio Hilliard \par \par CC: Prostate Cancer\par \par Patient returns 1 week after RALP for catheter removal. \par Patient doing well without N/V/Fever. Urine clear, draining well. \par Tolerating a regular diet, passing gas and BM\par No incisional complaints\par No leg swelling or calf pain, no SOB or CP\par \par Plan is for PSA in 6 weeks\par Patient educated as to performance/importance of Kegel exercises\par \par Pathology reviewed and options discussed (see below)\par

## 2023-03-03 DIAGNOSIS — I48.0 PAROXYSMAL ATRIAL FIBRILLATION: ICD-10-CM

## 2023-03-03 DIAGNOSIS — Z79.899 OTHER LONG TERM (CURRENT) DRUG THERAPY: ICD-10-CM

## 2023-03-03 DIAGNOSIS — E78.00 PURE HYPERCHOLESTEROLEMIA, UNSPECIFIED: ICD-10-CM

## 2023-03-03 DIAGNOSIS — K40.30 UNILATERAL INGUINAL HERNIA, WITH OBSTRUCTION, WITHOUT GANGRENE, NOT SPECIFIED AS RECURRENT: ICD-10-CM

## 2023-03-03 DIAGNOSIS — C61 MALIGNANT NEOPLASM OF PROSTATE: ICD-10-CM

## 2023-03-03 DIAGNOSIS — K40.90 UNILATERAL INGUINAL HERNIA, WITHOUT OBSTRUCTION OR GANGRENE, NOT SPECIFIED AS RECURRENT: ICD-10-CM

## 2023-03-03 DIAGNOSIS — R73.03 PREDIABETES: ICD-10-CM

## 2023-03-03 DIAGNOSIS — K45.8 OTHER SPECIFIED ABDOMINAL HERNIA WITHOUT OBSTRUCTION OR GANGRENE: ICD-10-CM

## 2023-03-06 ENCOUNTER — TRANSCRIPTION ENCOUNTER (OUTPATIENT)
Age: 58
End: 2023-03-06

## 2023-03-24 NOTE — PHYSICAL EXAM
[Abdomen Tenderness] : ~T No ~M abdominal tenderness [Abdomen Masses] : No abdominal masses [Normal rectal exam] : exam was normal [Nonprolapsing] : a nonprolapsing (grade I) [Normal] : was normal [None] : there was no rectal abscess [JVD] : no jugular venous distention  [Respiratory Effort] : normal respiratory effort [No Rash or Lesion] : No rash or lesion [Alert] : alert [Calm] : calm [de-identified] : Small skin tag right posterior [de-identified] : No acute distress

## 2023-03-24 NOTE — HISTORY OF PRESENT ILLNESS
[FreeTextEntry1] : 56-year-old male here for initial evaluation for rectal pressure.  Patient has a prior history of thrombosed external hemorrhoid that was treated 8 or 9 years ago.  He had a colonoscopy 8 years ago that was normal at [place].  He was told to have a follow-up in 7 to 10 years.  Patient has very rare bleeding with bowel movements when he gets constipation.  This bleeding resolves on its own usually by the next day.  Has not had any other procedures for hemorrhoids in the past.  He does not note significant lumps on the outside of his anal opening.  Denies significant constipation or straining he usually has relatively loose bowel movements.\par \par update- patient returns for follow up with some recent worsening of anal discomfort. previously seen for hemorrhoids but managed medically. Seeing urology for prostate, likely having a prostatectomy in near future, wanted to have hemorrhoids assessed vs possible fissure given the discomfort he's having.

## 2023-03-24 NOTE — ASSESSMENT
[FreeTextEntry1] : 57 year old male previously seen for symptomatic hemorrhoids. Appear stable on exam today, would not perform any procedures currently with upcoming planned prostatectomy. Continue with medical management of hemorrhoids and can follow up after surgery if any persistent issues/bleeding to reassess.

## 2023-04-10 ENCOUNTER — TRANSCRIPTION ENCOUNTER (OUTPATIENT)
Age: 58
End: 2023-04-10

## 2023-04-12 ENCOUNTER — APPOINTMENT (OUTPATIENT)
Dept: INTERNAL MEDICINE | Facility: CLINIC | Age: 58
End: 2023-04-12
Payer: COMMERCIAL

## 2023-04-12 PROCEDURE — XXXXX: CPT | Mod: 1L

## 2023-04-14 LAB — PSA, POST - PROSTATECTOMY: <0.01 NG/ML

## 2023-04-18 ENCOUNTER — APPOINTMENT (OUTPATIENT)
Dept: UROLOGY | Facility: CLINIC | Age: 58
End: 2023-04-18
Payer: COMMERCIAL

## 2023-04-18 VITALS
TEMPERATURE: 97.9 F | OXYGEN SATURATION: 98 % | DIASTOLIC BLOOD PRESSURE: 88 MMHG | HEART RATE: 69 BPM | SYSTOLIC BLOOD PRESSURE: 153 MMHG | BODY MASS INDEX: 24.9 KG/M2 | WEIGHT: 159 LBS

## 2023-04-18 PROCEDURE — 99024 POSTOP FOLLOW-UP VISIT: CPT

## 2023-04-18 NOTE — PHYSICAL EXAM
[General Appearance - Well Developed] : well developed [General Appearance - Well Nourished] : well nourished [Normal Appearance] : normal appearance [Well Groomed] : well groomed [General Appearance - In No Acute Distress] : no acute distress [] : no respiratory distress [Respiration, Rhythm And Depth] : normal respiratory rhythm and effort [Exaggerated Use Of Accessory Muscles For Inspiration] : no accessory muscle use [Abdomen Soft] : soft [Normal Station and Gait] : the gait and station were normal for the patient's age [No Focal Deficits] : no focal deficits [Oriented To Time, Place, And Person] : oriented to person, place, and time [Affect] : the affect was normal [Mood] : the mood was normal [Not Anxious] : not anxious

## 2023-04-18 NOTE — HISTORY OF PRESENT ILLNESS
[FreeTextEntry1] : \par Dr. TANIA CANELA\par [address] [place] [address]\par \par Dr. HEAVEN BECERRA \par [address] [place], [place] [address]\par \par Dr. Gloria Hector\par Dr. Dionisio Hilliard \par \par \par CC: Prostate Cancer\par \par Salvage multiport radical prostatectomy with right inguinal hernia repair on 23\par path=Mejia 7 (3+4), pT2, N0\par \par Doing great, has lost 20 lbs since surgery\par \par Occasional wearing pad.  Very rare leakage of urine.\par "Peeing like i am 15"'\par \par PSA < 0.01 ng/mL\par \par Taking daily tadalafil 5 mg but has been experiencing significant reflux from the medication also using vacuum device.  Started medication day after surgery and noticed a few painful nocturnal erections, not prolonged though.\par Is interested trying MUSE or ICI\par \par FAMILY HISTORY: Mother w/ metastatic breast cancer; sister history of breast cancer, and father had prostate cancer but  of other causes age 77 \par SOCIAL: Worked in [employer] for [employer], retired a year ago, , no children, non smoker \par SURGICAL: Focal cryo prostate, anterior C4-5 disc surgery, pulmonary vein ablation, left inguinal hernia with mesh\par

## 2023-04-18 NOTE — ASSESSMENT
[FreeTextEntry1] : Diagnosis: Prostate Cancer\par \par Plan:\par PT referral\par continue with Kegels\par PSA in 3 months\par Referral to Dr. Julien for ICI vs MUSE discussion \par \par RTC in 3 months

## 2023-04-21 LAB
ALBUMIN SERPL ELPH-MCNC: 4.7 G/DL
ALP BLD-CCNC: 81 U/L
ALT SERPL-CCNC: 16 U/L
ANION GAP SERPL CALC-SCNC: 14 MMOL/L
AST SERPL-CCNC: 15 U/L
BILIRUB SERPL-MCNC: 0.4 MG/DL
BUN SERPL-MCNC: 15 MG/DL
CALCIUM SERPL-MCNC: 10.1 MG/DL
CHLORIDE SERPL-SCNC: 102 MMOL/L
CHOLEST SERPL-MCNC: 129 MG/DL
CO2 SERPL-SCNC: 24 MMOL/L
CREAT SERPL-MCNC: 0.93 MG/DL
EGFR: 95 ML/MIN/1.73M2
ESTIMATED AVERAGE GLUCOSE: 120 MG/DL
GLUCOSE SERPL-MCNC: 111 MG/DL
HBA1C MFR BLD HPLC: 5.8 %
HCT VFR BLD CALC: 46.5 %
HDLC SERPL-MCNC: 44 MG/DL
HGB BLD-MCNC: 14.2 G/DL
LDLC SERPL CALC-MCNC: 70 MG/DL
MCHC RBC-ENTMCNC: 29 PG
MCHC RBC-ENTMCNC: 30.5 GM/DL
MCV RBC AUTO: 95.1 FL
NONHDLC SERPL-MCNC: 85 MG/DL
PLATELET # BLD AUTO: 241 K/UL
POTASSIUM SERPL-SCNC: 4.4 MMOL/L
PROT SERPL-MCNC: 7.2 G/DL
RBC # BLD: 4.89 M/UL
RBC # FLD: 14.2 %
SODIUM SERPL-SCNC: 141 MMOL/L
TRIGL SERPL-MCNC: 74 MG/DL
WBC # FLD AUTO: 4.42 K/UL

## 2023-04-24 ENCOUNTER — APPOINTMENT (OUTPATIENT)
Dept: UROLOGY | Facility: CLINIC | Age: 58
End: 2023-04-24
Payer: COMMERCIAL

## 2023-04-24 VITALS
OXYGEN SATURATION: 98 % | TEMPERATURE: 98 F | DIASTOLIC BLOOD PRESSURE: 84 MMHG | SYSTOLIC BLOOD PRESSURE: 129 MMHG | HEART RATE: 76 BPM

## 2023-04-24 DIAGNOSIS — R37 SEXUAL DYSFUNCTION, UNSPECIFIED: ICD-10-CM

## 2023-04-24 PROCEDURE — 99214 OFFICE O/P EST MOD 30 MIN: CPT | Mod: 24

## 2023-04-24 NOTE — HISTORY OF PRESENT ILLNESS
[FreeTextEntry1] : 57 year old retired \par  x 3; together x 20 years\par s/p RALP Dr Berg 2.2023\par Grade Group 7\par s.p focal ablation Dr Montenegro\par prior to RALP no sexual dysfunction\par daily tadalafil\par when catheter removed\par had spontaneous erections which were painful and then disappeared\par then erections disappeared and have now returned but no adequate for penetration\par now utilizing EVD and Ring\par Has not progressed to Tadalafil 20 mg

## 2023-04-24 NOTE — ASSESSMENT
[FreeTextEntry1] : 57 year old retired  who has been  x 3; together x 20 years\par s/p RALP Dr Berg 2.2023; Grade Group 7\par s.p focal ablation Dr Montenegro\par prior to RALP he denies  sexual dysfunction\par He has been maintained on daily tadalafil\par Of note is the fact that he reports excellent erections when catheter removed. However these erections which were  spontaneous erections were painful and then disappeared. He currently gets poor quality erections which are not sufficient for adequate for penetration. He has been utilizing EVD and Ring\par \par .Reviewed the concepts of cavernous nerve recovery and penile rehabilitation with the patient, including the rationale for a daily low-dose PDE5i which should be taken even if it does not help produce an erection, in addition to the need to achieve actual erections on a regular basis (regardless of whether they will be used for intercourse).  Discussed the fact that patients who do not respond to PDE5i alone usually supplement the daily pill with intracavernosal injections 1-3 x per week.  \par Discussed EVD as adjunct or alternative\par \par In light of earlier erectile function, we discussed increasing to tadalafil 20 mg prn and assessing response.\par If he does not achieve excellent erections will progress to IC with BIMIX 30/2\par \par Informational materials given to patient re IC, Penile rehabilitation and ED\par \par Plan\par 1. Tadalafil 20 mg\par 2. BIMIX 30/2\par 3. DUS in one month if needed\par

## 2023-04-24 NOTE — PHYSICAL EXAM
[] : no hepato-splenomegaly [Abdomen Tenderness] : non-tender [Urethral Meatus] : meatus normal [Penis Abnormality] : normal circumcised penis [Epididymis] : the epididymides were normal [Testes Tenderness] : no tenderness of the testes [Testes Mass (___cm)] : there were no testicular masses [FreeTextEntry1] : 12 cm

## 2023-04-24 NOTE — LETTER BODY
[Please see my note below.] : Please see my note below. [FreeTextEntry2] : Timo Ruth MD\par [address] [place] [address]\[place] [address] [FreeTextEntry1] :  \par Dear Doctor,\par \par \par I had the opportunity to see your patient, Mr. MANE HYDE in followup. I am enclosing my office note for your information.\par \par I will keep you informed of any developments.\par \par Feel free to contact me if you have any questions.\par \par Sincerely,\par \par Kevin Julien MD, FACS\par Professor of Urology\par [facility]\par \par [address]\par [place], [place] [address]\par \par [address]\par [place], [place] [address]\par \par Office Telephone \par [phone]\par \par Fax\par [phone]\par  [DrDewayne  ___] : Dr. JAUREGUI

## 2023-04-26 ENCOUNTER — NON-APPOINTMENT (OUTPATIENT)
Age: 58
End: 2023-04-26

## 2023-04-26 ENCOUNTER — APPOINTMENT (OUTPATIENT)
Dept: HEART AND VASCULAR | Facility: CLINIC | Age: 58
End: 2023-04-26
Payer: COMMERCIAL

## 2023-04-26 VITALS
OXYGEN SATURATION: 98 % | SYSTOLIC BLOOD PRESSURE: 120 MMHG | DIASTOLIC BLOOD PRESSURE: 74 MMHG | HEART RATE: 70 BPM | BODY MASS INDEX: 24.8 KG/M2 | WEIGHT: 158 LBS | HEIGHT: 67 IN | TEMPERATURE: 97.6 F | RESPIRATION RATE: 16 BRPM

## 2023-04-26 PROCEDURE — 93000 ELECTROCARDIOGRAM COMPLETE: CPT

## 2023-04-26 PROCEDURE — 99213 OFFICE O/P EST LOW 20 MIN: CPT | Mod: 25

## 2023-04-26 NOTE — CARDIOLOGY SUMMARY
[de-identified] : 4/26/2023 SR 62 bpm\par 10/14/22: SR 74 bpm\par 8/2/22 SR @ 72 bpm, normal axis / intervals\par NSR at 66 bpm, normal axis/intervals [de-identified] : EF 60%, severely dilated LA, mild MR, mildly enlarged RA

## 2023-04-26 NOTE — END OF VISIT
[Time Spent: ___ minutes] : I have spent [unfilled] minutes of time on the encounter. [FreeTextEntry3] : I, Sonia Granados, am scribing for (in the presence of) Dr. Shrestha the following sections: HPI, PMH,Family/social history, ROS, Physical Exam, Assessment / Plan. \par \par I, John Shrestha, personally performed the services described in the documentation, reviewed the documentation recorded by the scribe in my presence and it accurately and completely records my words and actions.

## 2023-04-26 NOTE — HISTORY OF PRESENT ILLNESS
[FreeTextEntry1] : Mr. Mancuso is a 57 year-old. male with a h/o PAF starting in late 2019. He was started on Eliquis at that time but had issues with bleeding. He did major lifestyle changes with 30 pound weight loss and reduction of stress, increase in exercise and symptoms / episodes improved significantly. Eliquis was stopped. However, he noted increasing episodes since early 2022.  LTM showed 9% Afib burden in May 2022.  He is s/p CRYO PVI 6/20/22.  He hasn't had any episodes of palpitations post procedure.  He notes that his HR has been higher at baseline (70bpm up from 60bpm) than usual when he exerts himself (up to ~ 110 bpm). He had a prostatectomy and R inguinal hernia repair 2/22/2023 at [facility] and had no AF recurrence throughout. He has also intentionally lost 20 lbs recently and feels very well. \par \par He presents today for follow-up and notes that he feels very well. He checks his Kardia often and states there has been no AF.

## 2023-05-11 ENCOUNTER — APPOINTMENT (OUTPATIENT)
Dept: UROLOGY | Facility: CLINIC | Age: 58
End: 2023-05-11
Payer: COMMERCIAL

## 2023-05-11 PROCEDURE — 93980 PENILE VASCULAR STUDY: CPT

## 2023-05-11 PROCEDURE — 54235 NJX CORPORA CAVERNOSA RX AGT: CPT | Mod: 79

## 2023-05-11 PROCEDURE — 55000 DRAINAGE OF HYDROCELE: CPT | Mod: 50,79

## 2023-05-25 ENCOUNTER — APPOINTMENT (OUTPATIENT)
Dept: UROLOGY | Facility: CLINIC | Age: 58
End: 2023-05-25
Payer: COMMERCIAL

## 2023-05-25 PROCEDURE — 99213 OFFICE O/P EST LOW 20 MIN: CPT

## 2023-05-25 NOTE — HISTORY OF PRESENT ILLNESS
[FreeTextEntry1] : 57 year old retired \par  x 3; together x 20 years\par s/p RALP Dr Berg 2.2023\par Grade Group 7\par s.p focal ablation Dr Montenegro\par prior to RALP no sexual dysfunction\par daily tadalafil\par when catheter removed\par had spontaneous erections which were painful and then disappeared\par then erections disappeared and have now returned but no adequate for penetration\par now utilizing EVD and Ring\par Has not progressed to Tadalafil 20 mg\par \par 5.25.2023\par Patient returns after previous IC injection resulted in priapism.  He has been utilizing tadalafil 5 mg daily and 20 mg of sildenafil as needed.  He states that his erections have improved and that he achieves a 50% erection.\par We discussed reinitiating treatment with IC at a reduced dose.  We also discussed proceeding with sildenafil at 100 mg.  He will stop the daily tadalafil.  If he does not respond to 100 mg sildenafil we will reinitiate the IC therapy at 2 to 3 units of 30/2 (bimix) plan

## 2023-05-25 NOTE — ASSESSMENT
[FreeTextEntry1] : 57 year old retired  who has been  x 3; together x 20 years\par s/p RALP Dr Berg 2.2023; Grade Group 7\par s.p focal ablation Dr Montenegro\par prior to RALP he denies  sexual dysfunction\par He has been maintained on daily tadalafil\par Of note is the fact that he reports excellent erections when catheter removed. However these erections which were  spontaneous erections were painful and then disappeared. He currently gets poor quality erections which are not sufficient for adequate for penetration. He has been utilizing EVD and Ring\par \par .Reviewed the concepts of cavernous nerve recovery and penile rehabilitation with the patient, including the rationale for a daily low-dose PDE5i which should be taken even if it does not help produce an erection, in addition to the need to achieve actual erections on a regular basis (regardless of whether they will be used for intercourse).  Discussed the fact that patients who do not respond to PDE5i alone usually supplement the daily pill with intracavernosal injections 1-3 x per week.  \par Discussed EVD as adjunct or alternative\par \par In light of earlier erectile function, we discussed increasing to tadalafil 20 mg prn and assessing response.\par If he does not achieve excellent erections will progress to IC with BIMIX 30/2\par \par Informational materials given to patient re IC, Penile rehabilitation and ED\par \par Plan\par 1. Tadalafil 20 mg\par 2. BIMIX 30/2\par 3. DUS in one month if needed\par \par 5.25.2023\par s/p DUS\par prolonged erection\par after DUS spontaneous erection\par continues on tadalfil 5 daily\par sildenafil 20 mg gets erection to 50 %  ; not sufficient for sexual intercourse\par will used EVD/constriction ring\par Plan:\par 1.  Stop daily tadalafil\par 2.  Initiate sildenafil 100 mg\par 3.  Return for IC if not responding to sildenafil 100 mg\par

## 2023-06-05 RX ORDER — PAPAVERINE HYDROCHLORIDE 30 MG/ML
30 INJECTION, SOLUTION INTRAVENOUS
Qty: 1 | Refills: 0 | Status: ACTIVE | COMMUNITY
Start: 2023-04-24 | End: 1900-01-01

## 2023-06-13 ENCOUNTER — NON-APPOINTMENT (OUTPATIENT)
Age: 58
End: 2023-06-13

## 2023-06-13 ENCOUNTER — APPOINTMENT (OUTPATIENT)
Dept: UROLOGY | Facility: CLINIC | Age: 58
End: 2023-06-13
Payer: COMMERCIAL

## 2023-06-13 VITALS — SYSTOLIC BLOOD PRESSURE: 137 MMHG | TEMPERATURE: 97.5 F | DIASTOLIC BLOOD PRESSURE: 82 MMHG | HEART RATE: 73 BPM

## 2023-06-13 PROCEDURE — 99214 OFFICE O/P EST MOD 30 MIN: CPT

## 2023-06-13 NOTE — HISTORY OF PRESENT ILLNESS
[FreeTextEntry1] : MANE HYDE is a 57 year M with PMHx of prostate cancer s/p RALP 2/22/2023, Hx hernia repair, Afib s/p ablation presenting for Erectile dysfunction s/p prostatectomy on 06/13/2023\par \par Cryoablation and active surveillance with [facility]\par Prostatectomy with [facility]\par \par Erectile dysfunction after prostate removal, endorses good function prior. Categorizes attainable erection as 5/10 with difficulty maintaining. \par - failing PDE5-i\par - currently tadalafil rehabilitation, KATHY and restrictive ring\par - interested in ICI.\par - seeing Nagler for ICI initiation but considering transitioning care, admits to priapism in office during PDUS, detumesced with Phen. \par \par He denies incontinence, hematuria, dysuria, fever, chills, flank pain. \par \par Social history; Social drinking, denies otherwise. \par \par Family history: Paternal prostate cancer, Maternal breast cancer\par \par Allergies: No\par \par Last PSA: undetectable\par \par \par

## 2023-06-15 ENCOUNTER — APPOINTMENT (OUTPATIENT)
Dept: FAMILY MEDICINE | Facility: CLINIC | Age: 58
End: 2023-06-15
Payer: COMMERCIAL

## 2023-06-15 VITALS
HEART RATE: 71 BPM | OXYGEN SATURATION: 98 % | BODY MASS INDEX: 24.33 KG/M2 | WEIGHT: 155 LBS | SYSTOLIC BLOOD PRESSURE: 114 MMHG | HEIGHT: 67 IN | DIASTOLIC BLOOD PRESSURE: 86 MMHG

## 2023-06-15 DIAGNOSIS — E83.19 OTHER DISORDERS OF IRON METABOLISM: ICD-10-CM

## 2023-06-15 DIAGNOSIS — E78.00 PURE HYPERCHOLESTEROLEMIA, UNSPECIFIED: ICD-10-CM

## 2023-06-15 DIAGNOSIS — F41.9 ANXIETY DISORDER, UNSPECIFIED: ICD-10-CM

## 2023-06-15 PROCEDURE — 99215 OFFICE O/P EST HI 40 MIN: CPT | Mod: 25

## 2023-06-15 PROCEDURE — 36415 COLL VENOUS BLD VENIPUNCTURE: CPT

## 2023-06-15 RX ORDER — OXYCODONE 5 MG/1
5 TABLET ORAL
Qty: 5 | Refills: 0 | Status: DISCONTINUED | COMMUNITY
Start: 2023-02-27 | End: 2023-06-15

## 2023-06-15 NOTE — HISTORY OF PRESENT ILLNESS
[FreeTextEntry1] : Follow up on medical problems. [de-identified] : Pt here for f/u.\par Has continued to purposefully lose weight.  Exercise.  Portion control with food.\par Had prostate surgery for cancer.  No cancer in lymph nodes.  Hopeful for cure.  Will be monitoring PSA.  \par At the same surgery, had inguinal repair with mesh.  Bilateral.\par Regained urinary continence since the surgery.  Back to normal.\par Erectile function has been mixed.  Has been on penile therapy.  Using Cialis.  Got ICI.  Saw Dr. Julien.  \par Uses alprazolam once a month.  Rare. Helpful.  Aware of risks.  Might use it sparingly going forward.\par Needs blood work.  No alcohol for five months. Wants to see A1c, lipids, CBC (to look at [facility] [place]par Still using eletriptan prn for headaches.  Effective.\par

## 2023-06-20 LAB
CHOLEST SERPL-MCNC: 160 MG/DL
ESTIMATED AVERAGE GLUCOSE: 123 MG/DL
HBA1C MFR BLD HPLC: 5.9 %
HDLC SERPL-MCNC: 55 MG/DL
LDLC SERPL CALC-MCNC: 89 MG/DL
NONHDLC SERPL-MCNC: 106 MG/DL
TRIGL SERPL-MCNC: 81 MG/DL

## 2023-06-27 ENCOUNTER — APPOINTMENT (OUTPATIENT)
Dept: UROLOGY | Facility: CLINIC | Age: 58
End: 2023-06-27
Payer: COMMERCIAL

## 2023-06-27 VITALS
WEIGHT: 155 LBS | DIASTOLIC BLOOD PRESSURE: 79 MMHG | OXYGEN SATURATION: 97 % | HEIGHT: 67 IN | TEMPERATURE: 98 F | SYSTOLIC BLOOD PRESSURE: 126 MMHG | BODY MASS INDEX: 24.33 KG/M2 | HEART RATE: 75 BPM

## 2023-06-27 PROCEDURE — 54235 NJX CORPORA CAVERNOSA RX AGT: CPT

## 2023-06-27 PROCEDURE — 99213 OFFICE O/P EST LOW 20 MIN: CPT | Mod: 25

## 2023-06-27 RX ORDER — BLOOD SUGAR DIAGNOSTIC
29G X 1/2" STRIP MISCELLANEOUS
Qty: 30 | Refills: 0 | Status: ACTIVE | COMMUNITY
Start: 2023-06-27 | End: 1900-01-01

## 2023-06-27 NOTE — ASSESSMENT
[FreeTextEntry1] : 57 year old retired  who has been  x 3; together x 20 years\par s/p RALP Dr Berg 2.2023; Grade Group 7\par s.p focal ablation Dr Montenegro\par prior to RALP he denies  sexual dysfunction\par He has been maintained on daily tadalafil\par Of note is the fact that he reports excellent erections when catheter removed. However these erections which were  spontaneous erections were painful and then disappeared. He currently gets poor quality erections which are not sufficient for adequate for penetration. He has been utilizing EVD and Ring\par \par .Reviewed the concepts of cavernous nerve recovery and penile rehabilitation with the patient, including the rationale for a daily low-dose PDE5i which should be taken even if it does not help produce an erection, in addition to the need to achieve actual erections on a regular basis (regardless of whether they will be used for intercourse).  Discussed the fact that patients who do not respond to PDE5i alone usually supplement the daily pill with intracavernosal injections 1-3 x per week.  \par Discussed EVD as adjunct or alternative\par \par In light of earlier erectile function, we discussed increasing to tadalafil 20 mg prn and assessing response.\par If he does not achieve excellent erections will progress to IC with BIMIX 30/2\par \par Informational materials given to patient re IC, Penile rehabilitation and ED\par \par Plan\par 1. Tadalafil 20 mg\par 2. BIMIX 30/2\par 3. DUS in one month if needed\par \par 5.25.2023\par s/p DUS\par prolonged erection\par after DUS spontaneous erection\par continues on tadalfil 5 daily\par sildenafil 20 mg gets erection to 50 %  ; not sufficient for sexual intercourse\par will used EVD/constriction ring\par \par 6.27.2023\par Patient returns after episode of priapism from bimix (30/2).  That was with 10 units.  He attempted sildenafil 100 mg without response and significant side effects.  He states he "felt awful" on this.  He does suffer from gastroesophageal reflux.  It did not result in improved erectile function.\par \par He continues on tadalafil 5 mg daily.\par \par He wished to proceed with repeat IC at this time.  He is aware of risks and complications of procedure and will proceed with 2 units.\par \par After IC with 2 units.  He had approximately 50% erection which detumesced within 1/2-hour.  He was instructed regarding IC procedures.  He is comfortable with this.  He understands risk of complications including priapism.\par \par Discussed dose escalation by 2 units as needed with injection ( 2 injections at the same dose without escalation.\par Warned re priapism risk and need for immediate intervention if erection lasts more than 2 hours.  Patient instructed to report to an emergency room and explicitly inform staff of his condition and need for treatment.\par

## 2023-06-27 NOTE — HISTORY OF PRESENT ILLNESS
[FreeTextEntry1] : 57 year old retired \par  x 3; together x 20 years\par s/p RALP Dr Berg 2.2023\par Grade Group 7\par s.p focal ablation Dr Montenegro\par prior to RALP no sexual dysfunction\par daily tadalafil\par when catheter removed\par had spontaneous erections which were painful and then disappeared\par then erections disappeared and have now returned but no adequate for penetration\par now utilizing EVD and Ring\par Has not progressed to Tadalafil 20 mg\par \par 5.25.2023\par Patient returns after previous IC injection resulted in priapism.  He has been utilizing tadalafil 5 mg daily and 20 mg of sildenafil as needed.  He states that his erections have improved and that he achieves a 50% erection.\par We discussed reinitiating treatment with IC at a reduced dose.  We also discussed proceeding with sildenafil at 100 mg.  He will stop the daily tadalafil.  If he does not respond to 100 mg sildenafil we will reinitiate the IC therapy at 2 to 3 units of 30/2 (bimix) plan\par \par 6.27.2023\par patient returns re ED and pharmocolgic priapism\par seen by Dr Nathan for second opinion\par

## 2023-07-07 ENCOUNTER — NON-APPOINTMENT (OUTPATIENT)
Age: 58
End: 2023-07-07

## 2023-07-12 ENCOUNTER — APPOINTMENT (OUTPATIENT)
Dept: UROLOGY | Facility: CLINIC | Age: 58
End: 2023-07-12
Payer: COMMERCIAL

## 2023-07-12 VITALS
TEMPERATURE: 97.6 F | OXYGEN SATURATION: 96 % | DIASTOLIC BLOOD PRESSURE: 70 MMHG | HEART RATE: 74 BPM | SYSTOLIC BLOOD PRESSURE: 109 MMHG

## 2023-07-12 PROCEDURE — 99213 OFFICE O/P EST LOW 20 MIN: CPT

## 2023-07-12 NOTE — ASSESSMENT
[FreeTextEntry1] : 57 year old retired  who has been  x 3; together x 20 years\par s/p RALP Dr Berg 2.2023; Grade Group 7\par s.p focal ablation Dr Montenegro\par prior to RALP he denies  sexual dysfunction\par He has been maintained on daily tadalafil\par Of note is the fact that he reports excellent erections when catheter removed. However these erections which were  spontaneous erections were painful and then disappeared. He currently gets poor quality erections which are not sufficient for adequate for penetration. He has been utilizing EVD and Ring\par \par .Reviewed the concepts of cavernous nerve recovery and penile rehabilitation with the patient, including the rationale for a daily low-dose PDE5i which should be taken even if it does not help produce an erection, in addition to the need to achieve actual erections on a regular basis (regardless of whether they will be used for intercourse).  Discussed the fact that patients who do not respond to PDE5i alone usually supplement the daily pill with intracavernosal injections 1-3 x per week.  \par Discussed EVD as adjunct or alternative\par \par In light of earlier erectile function, we discussed increasing to tadalafil 20 mg prn and assessing response.\par If he does not achieve excellent erections will progress to IC with BIMIX 30/2\par \par Informational materials given to patient re IC, Penile rehabilitation and ED\par \par Plan\par 1. Tadalafil 20 mg\par 2. BIMIX 30/2\par 3. DUS in one month if needed\par \par 5.25.2023\par s/p DUS\par continues on tadalfil 5 daily\par sildenafil 20 mg gets erection to 50 %  ; not sufficient for sexual intercourse\par will used EVD/constriction ring\par \par 6.27.2023\par Patient returns after episode of priapism from bimix (30/2).  That was with 10 units.  He attempted sildenafil 100 mg without response and significant side effects.  He states he "felt awful" on this.  He does suffer from gastroesophageal reflux.  It did not result in improved erectile function.\par \par He continues on tadalafil 5 mg daily.\par \par He wished to proceed with repeat IC at this time.  He is aware of risks and complications of procedure and will proceed with 2 units.\par \par After IC with 2 units.  He had approximately 50% erection which detumesced within 1/2-hour.  He was instructed regarding IC procedures.  He is comfortable with this.  He understands risk of complications including priapism.\par \par Discussed dose escalation by 2 units as needed with injection ( 2 injections at the same dose without escalation.\par Warned re priapism risk and need for immediate intervention if erection lasts more than 2 hours.  Patient instructed to report to an emergency room and explicitly inform staff of his condition and need for treatment.\par \par \par 7.12.2023\par Patient returns after another episode of priapism.  He had a erection for approximately 6 hours at [facility] he was treated with aspiration and phenylephrine.  He returns today.  His penis feels normal with length being 13 cm.  There is no induration.\par \par We discussed the fact that even with by mix at a minute amount he ends up with prolonged erections.  We discussed transition to alprostadil as a single agent.\par \par He will return for a injection of alprostadil\par Understands risk of priapsim

## 2023-07-12 NOTE — PHYSICAL EXAM
[FreeTextEntry1] : 13 cm ; no induation Venipuncture Paragraph: An alcohol pad was applied to the venipuncture site. Venipuncture was performed using a butterfly needle. Pressure and a bandaid was applied to the site. No complications were noted. Detail Level: None Bill For Individual Tests Below?: no Number Of Tubes Drawn: 2

## 2023-07-12 NOTE — HISTORY OF PRESENT ILLNESS
[FreeTextEntry1] : 57 year old retired \par  x 3; together x 20 years\par s/p RALP Dr Berg 2.2023\par Grade Group 7\par s.p focal ablation Dr Montenegro\par prior to RALP no sexual dysfunction\par daily tadalafil\par when catheter removed\par had spontaneous erections which were painful and then disappeared\par then erections disappeared and have now returned but no adequate for penetration\par now utilizing EVD and Ring\par Has not progressed to Tadalafil 20 mg\par \par 5.25.2023\par Patient returns after previous IC injection resulted in priapism.  He has been utilizing tadalafil 5 mg daily and 20 mg of sildenafil as needed.  He states that his erections have improved and that he achieves a 50% erection.\par We discussed reinitiating treatment with IC at a reduced dose.  We also discussed proceeding with sildenafil at 100 mg.  He will stop the daily tadalafil.  If he does not respond to 100 mg sildenafil we will reinitiate the IC therapy at 2 to 3 units of 30/2 (bimix) plan\par \par 6.27.2023\par patient returns re ED and pharmacologic priapism\par seen by Dr Nathan for second opinion\par \par 7.12.2023\par Patient returns on BIMIX\par Patient had episode of priapism\par Initially underwent treatment in the office.  He then returned for repeat injection using 2 units by mix.  He had a moderate response while in the office.  Subsequently he attempted injection  at home the first time he injected at 2 he did not have a reaction that was rigid or sufficient for sexual intercourse .  He subsequently increased this to 3-1/2-4 and had a priapism which was treated at [facility] with phenylephrine and aspiration.\par Patient took a Sudafed x2 with some detumesced but he regained erection with\par priapism.

## 2023-08-01 ENCOUNTER — APPOINTMENT (OUTPATIENT)
Dept: UROLOGY | Facility: CLINIC | Age: 58
End: 2023-08-01
Payer: COMMERCIAL

## 2023-08-01 PROCEDURE — 54220 IRRG CRPRA CAVRNOSA PRIAPISM: CPT

## 2023-08-01 PROCEDURE — 54235 NJX CORPORA CAVERNOSA RX AGT: CPT

## 2023-08-01 NOTE — HISTORY OF PRESENT ILLNESS
[FreeTextEntry1] : 57 year old retired   x 3; together x 20 years s/p RALP Dr Berg 2.2023 Grade Group 7 s.p focal ablation Dr Montenegro prior to RALP no sexual dysfunction daily tadalafil when catheter removed had spontaneous erections which were painful and then disappeared then erections disappeared and have now returned but no adequate for penetration now utilizing EVD and Ring Has not progressed to Tadalafil 20 mg  5.25.2023 Patient returns after previous IC injection resulted in priapism.  He has been utilizing tadalafil 5 mg daily and 20 mg of sildenafil as needed.  He states that his erections have improved and that he achieves a 50% erection. We discussed reinitiating treatment with IC at a reduced dose.  We also discussed proceeding with sildenafil at 100 mg.  He will stop the daily tadalafil.  If he does not respond to 100 mg sildenafil we will reinitiate the IC therapy at 2 to 3 units of 30/2 (bimix) plan  6.27.2023 patient returns re ED and pharmacologic priapism seen by Dr Nathan for second opinion  7.12.2023 Patient returns on BIMIX Patient had episode of priapism Initially underwent treatment in the office.  He then returned for repeat injection using 2 units by mix.  He had a moderate response while in the office.  Subsequently he attempted injection  at home the first time he injected at 2 he did not have a reaction that was rigid or sufficient for sexual intercourse .  He subsequently increased this to 3-1/2-4 and had a priapism which was treated at [facility] with phenylephrine and aspiration. Patient took a Sudafed x2 with some detumesced but he regained erection with priapism.

## 2023-08-01 NOTE — ASSESSMENT
[FreeTextEntry1] : 57 year old retired  who has been  x 3; together x 20 years s/p RALP Dr Berg 2.2023; Grade Group 7 s.p focal ablation Dr Montenegro prior to RALP he denies  sexual dysfunction He has been maintained on daily tadalafil Of note is the fact that he reports excellent erections when catheter removed. However these erections which were  spontaneous erections were painful and then disappeared. He currently gets poor quality erections which are not sufficient for adequate for penetration. He has been utilizing EVD and Ring  .Reviewed the concepts of cavernous nerve recovery and penile rehabilitation with the patient, including the rationale for a daily low-dose PDE5i which should be taken even if it does not help produce an erection, in addition to the need to achieve actual erections on a regular basis (regardless of whether they will be used for intercourse).  Discussed the fact that patients who do not respond to PDE5i alone usually supplement the daily pill with intracavernosal injections 1-3 x per week.   Discussed EVD as adjunct or alternative  In light of earlier erectile function, we discussed increasing to tadalafil 20 mg prn and assessing response. If he does not achieve excellent erections will progress to IC with BIMIX 30/2  Informational materials given to patient re IC, Penile rehabilitation and ED  Plan 1. Tadalafil 20 mg 2. BIMIX 30/2 3. DUS in one month if needed  5.25.2023 s/p DUS prolonged erection after DUS spontaneous erection continues on tadalfil 5 daily sildenafil 20 mg gets erection to 50 %  ; not sufficient for sexual intercourse will used EVD/constriction ring  6.27.2023 Patient returns after episode of priapism from bimix (30/2).  That was with 10 units.  He attempted sildenafil 100 mg without response and significant side effects.  He states he "felt awful" on this.  He does suffer from gastroesophageal reflux.  It did not result in improved erectile function.  He continues on tadalafil 5 mg daily.  He wished to proceed with repeat IC at this time.  He is aware of risks and complications of procedure and will proceed with 2 units.  After IC with 2 units.  He had approximately 50% erection which detumesced within 1/2-hour.  He was instructed regarding IC procedures.  He is comfortable with this.  He understands risk of complications including priapism.  Discussed dose escalation by 2 units as needed with injection ( 2 injections at the same dose without escalation. Warned re priapism risk and need for immediate intervention if erection lasts more than 2 hours.  Patient instructed to report to an emergency room and explicitly inform staff of his condition and need for treatment.

## 2023-08-15 ENCOUNTER — APPOINTMENT (OUTPATIENT)
Dept: UROLOGY | Facility: CLINIC | Age: 58
End: 2023-08-15

## 2023-09-22 ENCOUNTER — TRANSCRIPTION ENCOUNTER (OUTPATIENT)
Age: 58
End: 2023-09-22

## 2023-09-25 ENCOUNTER — TRANSCRIPTION ENCOUNTER (OUTPATIENT)
Age: 58
End: 2023-09-25

## 2023-10-11 ENCOUNTER — APPOINTMENT (OUTPATIENT)
Dept: UROLOGY | Facility: CLINIC | Age: 58
End: 2023-10-11
Payer: COMMERCIAL

## 2023-10-11 VITALS
WEIGHT: 160 LBS | SYSTOLIC BLOOD PRESSURE: 137 MMHG | BODY MASS INDEX: 25.11 KG/M2 | HEIGHT: 67 IN | HEART RATE: 73 BPM | DIASTOLIC BLOOD PRESSURE: 84 MMHG

## 2023-10-11 DIAGNOSIS — N52.31 ERECTILE DYSFUNCTION FOLLOWING RADICAL PROSTATECTOMY: ICD-10-CM

## 2023-10-11 PROCEDURE — 99214 OFFICE O/P EST MOD 30 MIN: CPT

## 2023-10-13 ENCOUNTER — TRANSCRIPTION ENCOUNTER (OUTPATIENT)
Age: 58
End: 2023-10-13

## 2023-10-18 PROBLEM — N52.31 ERECTILE DYSFUNCTION AFTER RADICAL PROSTATECTOMY: Status: RESOLVED | Noted: 2023-04-24 | Resolved: 2023-10-18

## 2023-10-19 ENCOUNTER — TRANSCRIPTION ENCOUNTER (OUTPATIENT)
Age: 58
End: 2023-10-19

## 2023-10-30 ENCOUNTER — APPOINTMENT (OUTPATIENT)
Dept: UROLOGY | Facility: CLINIC | Age: 58
End: 2023-10-30
Payer: COMMERCIAL

## 2023-10-30 VITALS
HEIGHT: 67 IN | BODY MASS INDEX: 25.11 KG/M2 | WEIGHT: 160 LBS | DIASTOLIC BLOOD PRESSURE: 70 MMHG | SYSTOLIC BLOOD PRESSURE: 104 MMHG | HEART RATE: 66 BPM

## 2023-10-30 DIAGNOSIS — Z87.438 PERSONAL HISTORY OF OTHER DISEASES OF MALE GENITAL ORGANS: ICD-10-CM

## 2023-10-30 DIAGNOSIS — N39.0 URINARY TRACT INFECTION, SITE NOT SPECIFIED: ICD-10-CM

## 2023-10-30 PROCEDURE — 99214 OFFICE O/P EST MOD 30 MIN: CPT

## 2023-10-31 LAB — PSA SERPL-MCNC: <0.01 NG/ML

## 2023-11-06 ENCOUNTER — TRANSCRIPTION ENCOUNTER (OUTPATIENT)
Age: 58
End: 2023-11-06

## 2023-11-29 ENCOUNTER — RX RENEWAL (OUTPATIENT)
Age: 58
End: 2023-11-29

## 2023-11-29 RX ORDER — TADALAFIL 5 MG/1
5 TABLET ORAL
Qty: 90 | Refills: 3 | Status: ACTIVE | COMMUNITY
Start: 2023-02-17 | End: 1900-01-01

## 2023-12-31 PROBLEM — Z20.2 POSSIBLE EXPOSURE TO STD: Status: ACTIVE | Noted: 2019-10-21

## 2024-02-05 ENCOUNTER — APPOINTMENT (OUTPATIENT)
Dept: FAMILY MEDICINE | Facility: CLINIC | Age: 59
End: 2024-02-05
Payer: COMMERCIAL

## 2024-02-05 VITALS
SYSTOLIC BLOOD PRESSURE: 124 MMHG | RESPIRATION RATE: 15 BRPM | WEIGHT: 160 LBS | DIASTOLIC BLOOD PRESSURE: 74 MMHG | HEIGHT: 67 IN | BODY MASS INDEX: 25.11 KG/M2 | OXYGEN SATURATION: 96 % | HEART RATE: 65 BPM | TEMPERATURE: 97.8 F

## 2024-02-05 DIAGNOSIS — N40.1 BENIGN PROSTATIC HYPERPLASIA WITH LOWER URINARY TRACT SYMPMS: ICD-10-CM

## 2024-02-05 DIAGNOSIS — N13.8 BENIGN PROSTATIC HYPERPLASIA WITH LOWER URINARY TRACT SYMPMS: ICD-10-CM

## 2024-02-05 DIAGNOSIS — E78.5 HYPERLIPIDEMIA, UNSPECIFIED: ICD-10-CM

## 2024-02-05 DIAGNOSIS — R73.03 PREDIABETES.: ICD-10-CM

## 2024-02-05 DIAGNOSIS — Z00.00 ENCOUNTER FOR GENERAL ADULT MEDICAL EXAMINATION W/OUT ABNORMAL FINDINGS: ICD-10-CM

## 2024-02-05 PROCEDURE — 36415 COLL VENOUS BLD VENIPUNCTURE: CPT

## 2024-02-05 PROCEDURE — 99396 PREV VISIT EST AGE 40-64: CPT

## 2024-02-05 RX ORDER — ALPROSTADIL 500 UG/ML
500 INJECTION, SOLUTION, CONCENTRATE INTRAVASCULAR
Qty: 5 | Refills: 0 | Status: DISCONTINUED | COMMUNITY
Start: 2023-07-12 | End: 2024-02-05

## 2024-02-05 RX ORDER — SILDENAFIL 100 MG/1
100 TABLET, FILM COATED ORAL
Qty: 30 | Refills: 2 | Status: DISCONTINUED | COMMUNITY
Start: 2023-05-25 | End: 2024-02-05

## 2024-02-05 RX ORDER — SILDENAFIL 20 MG/1
20 TABLET ORAL
Qty: 30 | Refills: 2 | Status: DISCONTINUED | COMMUNITY
Start: 2023-04-24 | End: 2024-02-05

## 2024-02-05 NOTE — ASSESSMENT
[FreeTextEntry1] : Pt needs colorectal cancer screening.  We discussed options.  He will see GI for colonoscopy. Aware that he needs Shingrix, Hep A, and Hep B shots. Continue with diet/exercise.

## 2024-02-05 NOTE — HISTORY OF PRESENT ILLNESS
[FreeTextEntry1] : Annual wellness visit [de-identified] : Pt here for f/u. One year since prostate surgery with Dr. Cruz.  (Had seen Dr. Pro in the past who had done an ablation but wasn't successful.  PSA has been negative since.  Had full urinary continence.  Some post void dribble.  Bladder hardening is going away. Had some erectile dysfunction.  Used some meds and had some priapisms that had to be reduced.  Ended up seeing Dr. Caba and getting good treatment. Wonders about thalassemia as a dx for making him sensitive to meds. Three weeks ago, was in [place].  Caught a respiratory virus. Stopped Tadalafil.  Seems that the shots weren't as potent without the tadalafil.  Taking the tadalafil again and stopping just two days before injection.  Gave himself a dose.  Got better erection.  Now has a good regimen that he feels comfortable. COVID vaccine 9/26/2023. RSV vaccine 12/27/23 Influenza vaccine 9/26/23 Aware that he needs Hep A and B shots. Eating high fiber foods.  Limiting intake. Will be seeing Dr. Rodrigues for f/u on pulm vein ablation. Needs colonoscopy in the next year or so.

## 2024-02-08 ENCOUNTER — APPOINTMENT (OUTPATIENT)
Dept: UROLOGY | Facility: CLINIC | Age: 59
End: 2024-02-08
Payer: COMMERCIAL

## 2024-02-08 VITALS
SYSTOLIC BLOOD PRESSURE: 126 MMHG | HEART RATE: 76 BPM | DIASTOLIC BLOOD PRESSURE: 83 MMHG | HEIGHT: 67 IN | BODY MASS INDEX: 25.11 KG/M2 | WEIGHT: 160 LBS

## 2024-02-08 DIAGNOSIS — C61 MALIGNANT NEOPLASM OF PROSTATE: ICD-10-CM

## 2024-02-08 DIAGNOSIS — N52.31 ERECTILE DYSFUNCTION FOLLOWING RADICAL PROSTATECTOMY: ICD-10-CM

## 2024-02-08 DIAGNOSIS — N48.6 INDURATION PENIS PLASTICA: ICD-10-CM

## 2024-02-08 PROCEDURE — 99214 OFFICE O/P EST MOD 30 MIN: CPT

## 2024-02-09 LAB — PSA SERPL-MCNC: <0.01 NG/ML

## 2024-02-13 LAB
ALBUMIN SERPL ELPH-MCNC: 4.7 G/DL
ALP BLD-CCNC: 62 U/L
ALT SERPL-CCNC: 19 U/L
ANION GAP SERPL CALC-SCNC: 14 MMOL/L
AST SERPL-CCNC: 18 U/L
BILIRUB SERPL-MCNC: 0.6 MG/DL
BUN SERPL-MCNC: 16 MG/DL
CALCIUM SERPL-MCNC: 9.7 MG/DL
CHLORIDE SERPL-SCNC: 104 MMOL/L
CHOLEST SERPL-MCNC: 185 MG/DL
CO2 SERPL-SCNC: 22 MMOL/L
CREAT SERPL-MCNC: 0.86 MG/DL
EGFR: 99 ML/MIN/1.73M2
ESTIMATED AVERAGE GLUCOSE: 123 MG/DL
GLUCOSE SERPL-MCNC: 89 MG/DL
HBA1C MFR BLD HPLC: 5.9 %
HCT VFR BLD CALC: 46.8 %
HDLC SERPL-MCNC: 47 MG/DL
HGB BLD-MCNC: 14.7 G/DL
LDLC SERPL CALC-MCNC: 121 MG/DL
MCHC RBC-ENTMCNC: 28.6 PG
MCHC RBC-ENTMCNC: 31.4 GM/DL
MCV RBC AUTO: 91.1 FL
NONHDLC SERPL-MCNC: 139 MG/DL
PLATELET # BLD AUTO: 206 K/UL
POTASSIUM SERPL-SCNC: 4.3 MMOL/L
PROT SERPL-MCNC: 7 G/DL
RBC # BLD: 5.14 M/UL
RBC # FLD: 13.8 %
SODIUM SERPL-SCNC: 139 MMOL/L
TRIGL SERPL-MCNC: 96 MG/DL
TSH SERPL-ACNC: 1.07 UIU/ML
WBC # FLD AUTO: 4.07 K/UL

## 2024-02-19 NOTE — ASSESSMENT
[FreeTextEntry1] : 57M here for erctile dysfunction s/p prostatectomy\par \par reviewed history with patient\par would like to proceed with Dr. Julien\par penile rehab per Dr. Julien\par concern is primarily over scheduling issues\par reviewed recovery of erections postop\par if furhter scheduling difficultyies we are happy to do injection training with Papaverine 5 units in office as a test dose
Universal Safety Interventions

## 2024-02-20 ENCOUNTER — APPOINTMENT (OUTPATIENT)
Dept: UROLOGY | Facility: CLINIC | Age: 59
End: 2024-02-20
Payer: COMMERCIAL

## 2024-02-20 VITALS
TEMPERATURE: 98.1 F | HEART RATE: 77 BPM | SYSTOLIC BLOOD PRESSURE: 142 MMHG | DIASTOLIC BLOOD PRESSURE: 87 MMHG | OXYGEN SATURATION: 97 %

## 2024-02-20 PROCEDURE — 99214 OFFICE O/P EST MOD 30 MIN: CPT

## 2024-02-20 NOTE — ASSESSMENT
[FreeTextEntry1] : Diagnosis  Prostate cancer  ED  Plan :   PSA annual  ED management with Dr. Rosales Berg MD, FACS, FRCS  of Urology [facility] Director of Laparoscopic and Robotic Surgery [facility] Director of Urology, [facility] Professor of Urology   (Office) [phone] (Cell)  [phone] [email or web address]  The total amount of time I have personally spent preparing for this visit, reviewing the patient's test results, obtaining external history, ordering tests/medications, documenting clinical information, communicating with and counseling the patient/family and/or caregiver(s), and spent face to face with the patient explaining the above was minutes =30

## 2024-02-20 NOTE — HISTORY OF PRESENT ILLNESS
[FreeTextEntry1] : CC: Prostate cancer   Multiport robotic transperitoneal (salvage from focal cryo) radical prostatectomy with pelvic LAD   Patient doing well  PSA <0.01 ng/ml  He has been essentially dry from catheter removal.   He has had strong erections with ICI but several episodes of priapism.  Tadalafil 5 mg daily - "gotten better over time"   Stopped tadalafil to see if that would prevent priapism when using ICI, but again he had priapism treated at [place].   He is now, under care of Dr. Caba, having success with adjusting concentration and dose.  He also was given phenyl ephedrine from bringing penis down which he did once successfully.        straightening leg

## 2024-02-26 PROBLEM — N48.6 PEYRONIE'S DISEASE: Status: ACTIVE | Noted: 2024-02-26

## 2024-02-26 PROBLEM — C61 PROSTATE CANCER: Status: ACTIVE | Noted: 2022-12-19

## 2024-02-26 PROBLEM — N52.31 ERECTILE DYSFUNCTION AFTER RADICAL PROSTATECTOMY: Status: ACTIVE | Noted: 2023-10-18

## 2024-02-26 NOTE — ASSESSMENT
[FreeTextEntry1] : Patient is a 58-year-old male status post robotic prostatectomy at [facility] within the last year.  He has been doing well with good control of his voiding with minimal stress incontinence..  He has had issues with erectile dysfunction and takes tadalafil 5 mg p.o. daily and is managed by half dose of bi mix.  He has had no significant episodes of priapism since we deluded the dosage.  He does have concerns about some curvature with erection.  On exam he does have a plaque about 1 cm in length.  He is scheduled to see Dr. Jamie Early for his follow-up regarding his prostate cancer.  Assessment plan 1.  Prostate cancer status post robotic prostatectomy 2 to have follow-up with the surgeon.  He has had no evidence of disease. 2.  Erectile dysfunction Continue tadalafil 5 mg p.o. daily and injection therapy. 3.  Peyronie's disease I told him to start vitamin E 400 IUs twice daily.  I did briefly discuss Xiaflex as an option as well but at this point time it is not that significant.  He will follow-up with me within 6 months.

## 2024-02-26 NOTE — PHYSICAL EXAM
[General Appearance - Well Developed] : well developed [Normal Appearance] : normal appearance [Respiration, Rhythm And Depth] : normal respiratory rhythm and effort [] : no rash [Oriented To Time, Place, And Person] : oriented to person, place, and time

## 2024-03-06 ENCOUNTER — NON-APPOINTMENT (OUTPATIENT)
Age: 59
End: 2024-03-06

## 2024-03-07 ENCOUNTER — RESULT REVIEW (OUTPATIENT)
Age: 59
End: 2024-03-07

## 2024-03-07 ENCOUNTER — APPOINTMENT (OUTPATIENT)
Dept: HEMATOLOGY ONCOLOGY | Facility: CLINIC | Age: 59
End: 2024-03-07
Payer: COMMERCIAL

## 2024-03-07 VITALS
HEIGHT: 67 IN | TEMPERATURE: 97.6 F | DIASTOLIC BLOOD PRESSURE: 84 MMHG | BODY MASS INDEX: 25.9 KG/M2 | OXYGEN SATURATION: 96 % | RESPIRATION RATE: 16 BRPM | HEART RATE: 73 BPM | WEIGHT: 165 LBS | SYSTOLIC BLOOD PRESSURE: 138 MMHG

## 2024-03-07 DIAGNOSIS — R71.8 OTHER ABNORMALITY OF RED BLOOD CELLS: ICD-10-CM

## 2024-03-07 PROCEDURE — 99204 OFFICE O/P NEW MOD 45 MIN: CPT

## 2024-03-08 ENCOUNTER — TRANSCRIPTION ENCOUNTER (OUTPATIENT)
Age: 59
End: 2024-03-08

## 2024-03-08 NOTE — REASON FOR VISIT
[Initial Consultation] : an initial consultation for [FreeTextEntry2] : abnormal Mean Cell Hemoglobin concentration R/o genetic blood disorder

## 2024-03-08 NOTE — ASSESSMENT
[FreeTextEntry1] : #Low RBC count, Low MCHC, elevated RDW -seen on prior labs -normal on our labs -will check for thalassemia, patient concerned for priapism. Discussed with patient that thalassemia is rare cause of priapism -will check for MPN, hemochromatosis, may benefit from phlebotomy if positive  -if workup is negative could consider viscosity, factor VIII, apls testing, but unlikely to show benefit  #Eretcile dysfunction -tadalafil, pappverine, eletriptan per urology

## 2024-03-08 NOTE — REVIEW OF SYSTEMS
[Diarrhea: Grade 0] : Diarrhea: Grade 0 [Negative] : Allergic/Immunologic [FreeTextEntry8] : Priapism

## 2024-03-08 NOTE — HISTORY OF PRESENT ILLNESS
[de-identified] : 58 year male referred for low RBC count, low MCHC, elevated RDW  Has had erectile dysfunction treated papaverine, developed priapisms.  Unclear etiology  [place] descent mother [place] father  Recommended to see hematology to rule out thalassemia History of afib has had fluctuation of blood pressure Started on blood thinner, briefly until afib ablation, dizziness blurry vision denies thrombosis personal history family history of thrombosis,  Father had pulmonary embolism after travel

## 2024-03-13 NOTE — PRE-OP CHECKLIST - NS PREOP CHK TEST_COVID_DT_GEN_ALL_CORE
RADAR   03/13/24 1640   Onset Documentation   Rapid Response Initiated By Radar auto page   Pager Time 1640   Arrival Time 1645   Event End Time 1700   Primary Reason for Call Radar auto page     RADAR page auto generated from VS -see documented VS. Radar score 6. Upon arrival pt resting in bed- no complaints. Pt on 5L NC- RR even non labored.  Pt ok to remain on the floor for continued monitoring- and will call with any concerns.      17-Feb-2023

## 2024-03-28 ENCOUNTER — APPOINTMENT (OUTPATIENT)
Dept: HEMATOLOGY ONCOLOGY | Facility: CLINIC | Age: 59
End: 2024-03-28
Payer: COMMERCIAL

## 2024-03-28 DIAGNOSIS — Z15.89 GENETIC SUSCEPTIBILITY TO OTHER DISEASE: ICD-10-CM

## 2024-03-28 DIAGNOSIS — N48.30 PRIAPISM, UNSPECIFIED: ICD-10-CM

## 2024-03-28 PROCEDURE — 99214 OFFICE O/P EST MOD 30 MIN: CPT

## 2024-03-29 PROBLEM — Z15.89 JAK-2 GENE MUTATION: Status: ACTIVE | Noted: 2024-03-29

## 2024-03-29 PROBLEM — N48.30 PRIAPISM: Status: ACTIVE | Noted: 2023-10-30

## 2024-03-29 NOTE — HISTORY OF PRESENT ILLNESS
[de-identified] : 58 year male referred for low RBC count, low MCHC, elevated RDW  Has had erectile dysfunction treated papaverine, developed priapisms.  Unclear etiology  [place] descent mother English father  Recommended to see hematology to rule out thalassemia History of afib has had fluctuation of blood pressure Started on blood thinner, briefly until afib ablation, dizziness blurry vision denies thrombosis personal history family history of thrombosis,  Father had pulmonary embolism after travel

## 2024-03-29 NOTE — ASSESSMENT
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 Hgb >16.5 or Hct>49% 2. Aydin 2 mutation 3. Bone marrow biopsy consistent with a  myeloproliferative disorder -patient does not meet criteria based on normal hemoglobin and hematocrit -also has normal platelets, does not have essential thrombocytosis, which would be the above criteria with platelet>450 instead of Hgb -offered patient bone marrow biopsy to confirm diagnosis, patient prefers to have CBC check with Dr. Gale and if Hgb>16.5, Hgb>49% or Platelets>450, patient will return -will send note to Dr. Gale  #Elevated ferritin per patient in the past -negative hemochromatosis mutation  #Low RBC count, Low MCHC, elevated RDW -seen on prior labs -normal on our labs -no evidence of thalassemia, negative alpha and beta globin      #Eretcile dysfunction -tadalafil, pappverine, eletriptan per urology  #RTC -if if Hgb>16.5, Hgb>49% or Platelets>450, patient will return

## 2024-04-05 ENCOUNTER — APPOINTMENT (OUTPATIENT)
Dept: FAMILY MEDICINE | Facility: CLINIC | Age: 59
End: 2024-04-05

## 2024-04-05 ENCOUNTER — TRANSCRIPTION ENCOUNTER (OUTPATIENT)
Age: 59
End: 2024-04-05

## 2024-05-04 ENCOUNTER — NON-APPOINTMENT (OUTPATIENT)
Age: 59
End: 2024-05-04

## 2024-05-07 ENCOUNTER — RX RENEWAL (OUTPATIENT)
Age: 59
End: 2024-05-07

## 2024-05-07 RX ORDER — ROSUVASTATIN CALCIUM 10 MG/1
10 TABLET, FILM COATED ORAL
Qty: 90 | Refills: 3 | Status: ACTIVE | COMMUNITY
Start: 2022-11-14 | End: 1900-01-01

## 2024-05-13 ENCOUNTER — TRANSCRIPTION ENCOUNTER (OUTPATIENT)
Age: 59
End: 2024-05-13

## 2024-05-13 DIAGNOSIS — U07.1 COVID-19: ICD-10-CM

## 2024-05-13 RX ORDER — NIRMATRELVIR AND RITONAVIR 300-100 MG
20 X 150 MG & KIT ORAL
Qty: 1 | Refills: 0 | Status: ACTIVE | COMMUNITY
Start: 2024-05-13 | End: 1900-01-01

## 2024-06-11 ENCOUNTER — APPOINTMENT (OUTPATIENT)
Dept: HEART AND VASCULAR | Facility: CLINIC | Age: 59
End: 2024-06-11
Payer: COMMERCIAL

## 2024-06-11 ENCOUNTER — NON-APPOINTMENT (OUTPATIENT)
Age: 59
End: 2024-06-11

## 2024-06-11 VITALS
OXYGEN SATURATION: 97 % | TEMPERATURE: 97.8 F | RESPIRATION RATE: 18 BRPM | DIASTOLIC BLOOD PRESSURE: 86 MMHG | BODY MASS INDEX: 27.15 KG/M2 | HEART RATE: 70 BPM | SYSTOLIC BLOOD PRESSURE: 118 MMHG | HEIGHT: 67 IN | WEIGHT: 173 LBS

## 2024-06-11 DIAGNOSIS — Z91.89 OTHER SPECIFIED PERSONAL RISK FACTORS, NOT ELSEWHERE CLASSIFIED: ICD-10-CM

## 2024-06-11 DIAGNOSIS — I48.0 PAROXYSMAL ATRIAL FIBRILLATION: ICD-10-CM

## 2024-06-11 PROCEDURE — 93000 ELECTROCARDIOGRAM COMPLETE: CPT

## 2024-06-11 PROCEDURE — G2211 COMPLEX E/M VISIT ADD ON: CPT

## 2024-06-11 PROCEDURE — 99213 OFFICE O/P EST LOW 20 MIN: CPT

## 2024-06-11 NOTE — HISTORY OF PRESENT ILLNESS
[FreeTextEntry1] : Mr. Mancuso is a 58 year-old. male with a h/o PAF starting in late 2019. He was started on Eliquis at that time but had issues with bleeding. He did major lifestyle changes with 30 pound weight loss and reduction of stress, increase in exercise and symptoms / episodes improved significantly. Eliquis was stopped. However, he noted increasing episodes since early 2022.  LTM showed 9% Afib burden in May 2022.  He is s/p CRYO PVI 6/20/22.   He continues to deny any recurrent palpitations post procedure.  He had a prostatectomy and R inguinal hernia repair 2/22/2023 at [facility] and had no AF recurrence throughout.  Interval history significant for erectile dysfunction treated with Papaverine and he developed priapism.  Treated with Phenylephrine and he did not have any recurrent AF.   Heme w/u for abnormal MCHC, concern for thalassemia revealed Aydin 2 mutation.  Bone marrow biopsy deferred.

## 2024-06-11 NOTE — CARDIOLOGY SUMMARY
[de-identified] : 6/11/24 SR @ 63 bpm  4/26/2023 SR 62 bpm 10/14/22: SR 74 bpm 8/2/22 SR @ 72 bpm, normal axis / intervals NSR at 66 bpm, normal axis/intervals [de-identified] : EF 60%, severely dilated LA, mild MR, mildly enlarged RA

## 2024-06-11 NOTE — ADDENDUM
[FreeTextEntry1] : I, Tata Cruz, am scribing for and the presence of Dr. Shrestha the following sections: HPI, PMH,Family/social history, ROS, Physical Exam, Assessment / Plan.  I, John Shrestha, personally performed the services described in the documentation, reviewed the documentation recorded by the scribe in my presence and it accurately and completely records my words and actions.

## 2024-06-19 ENCOUNTER — APPOINTMENT (OUTPATIENT)
Dept: INTERNAL MEDICINE | Facility: CLINIC | Age: 59
End: 2024-06-19

## 2024-06-27 ENCOUNTER — APPOINTMENT (OUTPATIENT)
Dept: PAIN MANAGEMENT | Facility: CLINIC | Age: 59
End: 2024-06-27
Payer: COMMERCIAL

## 2024-06-27 VITALS
DIASTOLIC BLOOD PRESSURE: 95 MMHG | WEIGHT: 173 LBS | HEIGHT: 67 IN | SYSTOLIC BLOOD PRESSURE: 156 MMHG | BODY MASS INDEX: 27.15 KG/M2

## 2024-06-27 DIAGNOSIS — M54.16 RADICULOPATHY, LUMBAR REGION: ICD-10-CM

## 2024-06-27 PROCEDURE — 99204 OFFICE O/P NEW MOD 45 MIN: CPT

## 2024-08-05 ENCOUNTER — APPOINTMENT (OUTPATIENT)
Dept: HEMATOLOGY ONCOLOGY | Facility: CLINIC | Age: 59
End: 2024-08-05

## 2024-08-05 ENCOUNTER — RESULT REVIEW (OUTPATIENT)
Age: 59
End: 2024-08-05

## 2024-08-05 PROCEDURE — 99214 OFFICE O/P EST MOD 30 MIN: CPT

## 2024-08-05 NOTE — REASON FOR VISIT
[Initial Consultation] : an initial consultation for [Follow-Up Visit] : a follow-up visit for [FreeTextEntry2] : abnormal Mean Cell Hemoglobin concentration R/o genetic blood disorder

## 2024-08-05 NOTE — ASSESSMENT
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 Hgb >16.5 or Hct>49% 2. Aydin 2 mutation 3. Bone marrow biopsy consistent with a  myeloproliferative disorder -patient does not meet criteria based on normal hemoglobin and hematocrit -also has normal platelets, does not have essential thrombocytosis, which would be the above criteria with platelet>450 instead of Hgb -defer  bone marrow biopsy repeat CBC, if HCT>45 or Plts>450 will d/w patient bone marrow biopsy   #Elevated ferritin per patient in the past -negative hemochromatosis mutation  #Low RBC count, Low MCHC, elevated RDW -seen on prior labs -normal on our labs -no evidence of thalassemia, negative alpha and beta globin      #Eretcile dysfunction -tadalafil, pappverine, eletriptan per urology  #RTC Labs next appointment: CBC, EPO

## 2024-08-05 NOTE — ASSESSMENT
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 Hgb >16.5 or Hct>49% 2. Aydin 2 mutation 3. Bone marrow biopsy consistent with a  myeloproliferative disorder -patient does not meet criteria based on normal hemoglobin and hematocrit -also has normal platelets, does not have essential thrombocytosis, which would be the above criteria with platelet>450 instead of Hgb -defer  bone marrow biopsy repeat CBC, if HCT>45 or Plts>450 will d/w patient bone marrow biopsy   #Elevated ferritin per patient in the past -negative hemochromatosis mutation  #Low RBC count, Low MCHC, elevated RDW -seen on prior labs -normal on our labs -no evidence of thalassemia, negative alpha and beta globin      #Eretcile dysfunction -tadalafil, pappverine, eletriptan per urology  #RTC Labs next appointment: CBC, EPO

## 2024-08-05 NOTE — HISTORY OF PRESENT ILLNESS
[de-identified] : 58 year male referred for low RBC count, low MCHC, elevated RDW  Has had erectile dysfunction treated papaverine, developed priapisms.  Unclear etiology  [place] descent mother English father  Recommended to see hematology to rule out thalassemia History of afib has had fluctuation of blood pressure Started on blood thinner, briefly until afib ablation, dizziness blurry vision denies thrombosis personal history family history of thrombosis,  Father had pulmonary embolism after travel [de-identified] : Patient is here for follow up for JAK2. Patient overall feels well with no new issues. Patient states that he had COVID 2 weeks ago and was on paxlovid. Patient had a CT scan of his back 4 weeks ago due to back pain issues, transitional L5 . He will be following up with .   8/5/2024 in good spirits PCP prefer patient follows here

## 2024-08-05 NOTE — HISTORY OF PRESENT ILLNESS
[de-identified] : 58 year male referred for low RBC count, low MCHC, elevated RDW  Has had erectile dysfunction treated papaverine, developed priapisms.  Unclear etiology  Irish descent mother English father  Recommended to see hematology to rule out thalassemia History of afib has had fluctuation of blood pressure Started on blood thinner, briefly until afib ablation, dizziness blurry vision denies thrombosis personal history family history of thrombosis,  Father had pulmonary embolism after travel [de-identified] : Patient is here for follow up for JAK2. Patient overall feels well with no new issues. Patient states that he had COVID 2 weeks ago and was on paxlovid. Patient had a CT scan of his back 4 weeks ago due to back pain issues, transitional L5 . He will be following up with .   8/5/2024 in good spirits PCP prefer patient follows here

## 2024-08-06 ENCOUNTER — APPOINTMENT (OUTPATIENT)
Dept: UROLOGY | Facility: CLINIC | Age: 59
End: 2024-08-06

## 2024-08-06 ENCOUNTER — NON-APPOINTMENT (OUTPATIENT)
Age: 59
End: 2024-08-06

## 2024-08-06 PROCEDURE — 99213 OFFICE O/P EST LOW 20 MIN: CPT

## 2024-08-07 NOTE — END OF VISIT
[FreeTextEntry3] : I, Dr. Nathan, personally performed the evaluation and management (E/M) services for this established patient who presents today with (a) new problem(s)/exacerbation of (an) existing condition(s). That E/M includes conducting the clinically appropriate interval history &/or exam, assessing all new/exacerbated conditions, and establishing a new plan of care. Today, my RM, [employer]mita Wismeanins, was here to observe my evaluation and management service for this new problem/exacerbated condition and follow the plan of care established by me going forward

## 2024-08-07 NOTE — PHYSICAL EXAM
[Normal Appearance] : normal appearance [Well Groomed] : well groomed [General Appearance - In No Acute Distress] : no acute distress [Respiration, Rhythm And Depth] : normal respiratory rhythm and effort [Exaggerated Use Of Accessory Muscles For Inspiration] : no accessory muscle use [Urethral Meatus] : meatus normal [Penis Abnormality] : normal circumcised penis [Scrotum] : the scrotum was normal [Testes Tenderness] : no tenderness of the testes [Testes Mass (___cm)] : there were no testicular masses [Normal Station and Gait] : the gait and station were normal for the patient's age [] : no rash [No Focal Deficits] : no focal deficits [Oriented To Time, Place, And Person] : oriented to person, place, and time [Affect] : the affect was normal [Mood] : the mood was normal [de-identified] : B/L 20cc testes, Small plaque noted at L base.

## 2024-08-07 NOTE — ASSESSMENT
[FreeTextEntry1] : 57yo M radha s/p ICI for ED s/p RALP for prostate cancer reviewed natural hisistory  increasing bother for penile doppler (small doses bimix with priapism) doppler with papaverine

## 2024-08-07 NOTE — PHYSICAL EXAM
[Normal Appearance] : normal appearance [Well Groomed] : well groomed [General Appearance - In No Acute Distress] : no acute distress [Respiration, Rhythm And Depth] : normal respiratory rhythm and effort [Exaggerated Use Of Accessory Muscles For Inspiration] : no accessory muscle use [Urethral Meatus] : meatus normal [Penis Abnormality] : normal circumcised penis [Scrotum] : the scrotum was normal [Testes Tenderness] : no tenderness of the testes [Testes Mass (___cm)] : there were no testicular masses [Normal Station and Gait] : the gait and station were normal for the patient's age [] : no rash [No Focal Deficits] : no focal deficits [Oriented To Time, Place, And Person] : oriented to person, place, and time [Mood] : the mood was normal [Affect] : the affect was normal [de-identified] : B/L 20cc testes, Small plaque noted at L base.

## 2024-08-07 NOTE — END OF VISIT
[FreeTextEntry3] : I, Dr. Nathan, personally performed the evaluation and management (E/M) services for this established patient who presents today with (a) new problem(s)/exacerbation of (an) existing condition(s). That E/M includes conducting the clinically appropriate interval history &/or exam, assessing all new/exacerbated conditions, and establishing a new plan of care. Today, my RM, [employer] Wisemanins, was here to observe my evaluation and management service for this new problem/exacerbated condition and follow the plan of care established by me going forward

## 2024-08-07 NOTE — END OF VISIT
[FreeTextEntry3] : I, Dr. Nathan, personally performed the evaluation and management (E/M) services for this established patient who presents today with (a) new problem(s)/exacerbation of (an) existing condition(s). That E/M includes conducting the clinically appropriate interval history &/or exam, assessing all new/exacerbated conditions, and establishing a new plan of care. Today, my RM, [employer] Wisemanins, was here to observe my evaluation and management service for this new problem/exacerbated condition and follow the plan of care established by me going forward

## 2024-08-07 NOTE — PHYSICAL EXAM
[Normal Appearance] : normal appearance [Well Groomed] : well groomed [General Appearance - In No Acute Distress] : no acute distress [Respiration, Rhythm And Depth] : normal respiratory rhythm and effort [Exaggerated Use Of Accessory Muscles For Inspiration] : no accessory muscle use [Urethral Meatus] : meatus normal [Penis Abnormality] : normal circumcised penis [Scrotum] : the scrotum was normal [Testes Tenderness] : no tenderness of the testes [Testes Mass (___cm)] : there were no testicular masses [Normal Station and Gait] : the gait and station were normal for the patient's age [] : no rash [No Focal Deficits] : no focal deficits [Oriented To Time, Place, And Person] : oriented to person, place, and time [Mood] : the mood was normal [Affect] : the affect was normal [de-identified] : B/L 20cc testes, Small plaque noted at L base.

## 2024-08-07 NOTE — PHYSICAL EXAM
[Normal Appearance] : normal appearance [Well Groomed] : well groomed [General Appearance - In No Acute Distress] : no acute distress [Respiration, Rhythm And Depth] : normal respiratory rhythm and effort [Exaggerated Use Of Accessory Muscles For Inspiration] : no accessory muscle use [Urethral Meatus] : meatus normal [Penis Abnormality] : normal circumcised penis [Scrotum] : the scrotum was normal [Testes Tenderness] : no tenderness of the testes [Testes Mass (___cm)] : there were no testicular masses [Normal Station and Gait] : the gait and station were normal for the patient's age [] : no rash [No Focal Deficits] : no focal deficits [Oriented To Time, Place, And Person] : oriented to person, place, and time [Affect] : the affect was normal [Mood] : the mood was normal [de-identified] : B/L 20cc testes, Small plaque noted at L base.

## 2024-08-07 NOTE — ASSESSMENT
[FreeTextEntry1] : 59yo M radha s/p ICI for ED s/p RALP for prostate cancer reviewed natural hisistory  increasing bother for penile doppler (small doses bimix with priapism) doppler with papaverine

## 2024-08-07 NOTE — END OF VISIT
[FreeTextEntry3] : I, Dr. Nathan, personally performed the evaluation and management (E/M) services for this established patient who presents today with (a) new problem(s)/exacerbation of (an) existing condition(s). That E/M includes conducting the clinically appropriate interval history &/or exam, assessing all new/exacerbated conditions, and establishing a new plan of care. Today, my RM, [employer] Wisemanins, was here to observe my evaluation and management service for this new problem/exacerbated condition and follow the plan of care established by me going forward

## 2024-09-05 ENCOUNTER — APPOINTMENT (OUTPATIENT)
Dept: UROLOGY | Facility: CLINIC | Age: 59
End: 2024-09-05
Payer: COMMERCIAL

## 2024-09-05 VITALS
BODY MASS INDEX: 27.15 KG/M2 | DIASTOLIC BLOOD PRESSURE: 73 MMHG | TEMPERATURE: 97.9 F | SYSTOLIC BLOOD PRESSURE: 132 MMHG | WEIGHT: 173 LBS | HEIGHT: 67 IN | HEART RATE: 83 BPM

## 2024-09-05 PROCEDURE — J3490T: CUSTOM | Mod: NC

## 2024-09-05 PROCEDURE — 93980 PENILE VASCULAR STUDY: CPT

## 2024-09-05 PROCEDURE — 54235 NJX CORPORA CAVERNOSA RX AGT: CPT

## 2024-09-05 PROCEDURE — 99214 OFFICE O/P EST MOD 30 MIN: CPT | Mod: 25

## 2024-09-05 NOTE — HISTORY OF PRESENT ILLNESS
[FreeTextEntry1] : +ED s/p RALP see attached oppler good response papaperine alone 5 units no prolonged erection +curve with indent

## 2024-09-05 NOTE — ASSESSMENT
[FreeTextEntry1] : chronic peyronies We had a long conversation regarding treatment options in chronic Peyronie's disease. Treatments, including observation, the role of Xiaflex, penile plication surgery, plaque incision and grafting surgery, and insertion of inflatable penile prosthesis, were discussed at length.  The risks and benefits of Xiaflex were discussed. These include, significant hematoma and a small risk of penile fracture. The perceived and reported benefits of this treatment were discussed at length as well. The fact that he needs to abstain from sexual activity after treatment was discussed as was the length of the treatment regimen. In addition he understands that he will need to use a penile traction device and we spoke about the role of penile modeling. Literature fully delineating what to expect from this treatment was provided to the patient.   In addition, the risks of surgical treatment were discussed. With penile plication, he understands that shortening of the penis may be perceived. In addition, palpable knots from the sutures placed under the skin may be present. Possible risk of injury to the urethra and to other penile structures was discussed at length as well. Decreased penile sensitivity has also been reported. With plaque incision and grafting, the risk of long-standing erectile dysfunction in the postoperative period was discussed. Glans numbness and decreased penile sensitivity as a risk of the procedure was discussed as well.  Finally, the role of inflatable penile prosthesis as a treatment for both Peyronie's disease and erectile dysfunction was discussed. The 1-2% risk of device infection, device malfunction rates, risks of injury to contiguous organs including the urethra, risk of phallic shortening as well as the possiblity that additional intraoperative adjunctive maneuvers would be necessary (including modeling and plication) to get him to a straight phallus was also discussed. opts to observe f/u PRN

## 2024-09-10 ENCOUNTER — APPOINTMENT (OUTPATIENT)
Dept: CARDIOLOGY | Facility: CLINIC | Age: 59
End: 2024-09-10
Payer: COMMERCIAL

## 2024-09-10 PROCEDURE — 93246 EXT ECG>7D<15D RECORDING: CPT

## 2024-09-30 PROCEDURE — 93244 EXT ECG>48HR<7D REV&INTERPJ: CPT

## 2024-10-03 ENCOUNTER — APPOINTMENT (OUTPATIENT)
Dept: SURGERY | Facility: CLINIC | Age: 59
End: 2024-10-03
Payer: COMMERCIAL

## 2024-10-03 VITALS
SYSTOLIC BLOOD PRESSURE: 132 MMHG | DIASTOLIC BLOOD PRESSURE: 86 MMHG | WEIGHT: 166 LBS | HEIGHT: 67 IN | BODY MASS INDEX: 26.06 KG/M2 | OXYGEN SATURATION: 96 % | RESPIRATION RATE: 16 BRPM | HEART RATE: 62 BPM

## 2024-10-03 DIAGNOSIS — R10.13 EPIGASTRIC PAIN: ICD-10-CM

## 2024-10-03 PROCEDURE — 99213 OFFICE O/P EST LOW 20 MIN: CPT

## 2024-10-07 ENCOUNTER — RESULT REVIEW (OUTPATIENT)
Age: 59
End: 2024-10-07

## 2024-10-07 NOTE — ASSESSMENT
[FreeTextEntry1] : 59-year-old male with some intermittent lower abdominal pain as well as worsening reflux symptoms.  Will get a baseline ultrasound to assess for any symptomatic gallstones we will place referral for GI to establish care for potential repeat upper endoscopy and will soon be in need of surveillance colonoscopy.  Will follow-up with patient once ultrasound is completed to assess for any symptomatic gallstones.

## 2024-10-07 NOTE — PHYSICAL EXAM
[Abdomen Masses] : No abdominal masses [Abdomen Tenderness] : ~T No ~M abdominal tenderness [JVD] : no jugular venous distention  [Respiratory Effort] : normal respiratory effort [No Rash or Lesion] : No rash or lesion [Alert] : alert [Calm] : calm [de-identified] : No acute distress

## 2024-10-07 NOTE — HISTORY OF PRESENT ILLNESS
[FreeTextEntry1] : 56-year-old male here for initial evaluation for rectal pressure.  Patient has a prior history of thrombosed external hemorrhoid that was treated 8 or 9 years ago.  He had a colonoscopy 8 years ago that was normal at [facility].  He was told to have a follow-up in 7 to 10 years.  Patient has very rare bleeding with bowel movements when he gets constipation.  This bleeding resolves on its own usually by the next day.  Has not had any other procedures for hemorrhoids in the past.  He does not note significant lumps on the outside of his anal opening.  Denies significant constipation or straining he usually has relatively loose bowel movements.  update-patient now returns after a couple years previously seen for hemorrhoids.  He underwent a robotic assisted prostatectomy with consensus.  More recently has been having some lower abdominal pain as well as worsening of his epigastric discomfort and GERD symptoms.  Has been requiring increasing doses of PPIs.  Has not followed up with GI recently.  Has not previously had imaging to assess for gallstones

## 2024-10-10 ENCOUNTER — APPOINTMENT (OUTPATIENT)
Dept: FAMILY MEDICINE | Facility: CLINIC | Age: 59
End: 2024-10-10
Payer: COMMERCIAL

## 2024-10-10 VITALS
HEIGHT: 67 IN | BODY MASS INDEX: 26.06 KG/M2 | TEMPERATURE: 98.4 F | OXYGEN SATURATION: 97 % | SYSTOLIC BLOOD PRESSURE: 126 MMHG | WEIGHT: 166 LBS | HEART RATE: 67 BPM | DIASTOLIC BLOOD PRESSURE: 74 MMHG

## 2024-10-10 DIAGNOSIS — Z15.89 GENETIC SUSCEPTIBILITY TO OTHER DISEASE: ICD-10-CM

## 2024-10-10 DIAGNOSIS — C61 MALIGNANT NEOPLASM OF PROSTATE: ICD-10-CM

## 2024-10-10 DIAGNOSIS — N48.6 INDURATION PENIS PLASTICA: ICD-10-CM

## 2024-10-10 DIAGNOSIS — I48.0 PAROXYSMAL ATRIAL FIBRILLATION: ICD-10-CM

## 2024-10-10 DIAGNOSIS — R73.03 PREDIABETES.: ICD-10-CM

## 2024-10-10 PROCEDURE — G2211 COMPLEX E/M VISIT ADD ON: CPT | Mod: NC

## 2024-10-10 PROCEDURE — 36415 COLL VENOUS BLD VENIPUNCTURE: CPT

## 2024-10-10 PROCEDURE — 99215 OFFICE O/P EST HI 40 MIN: CPT

## 2024-10-14 ENCOUNTER — NON-APPOINTMENT (OUTPATIENT)
Age: 59
End: 2024-10-14

## 2024-10-15 DIAGNOSIS — E78.5 HYPERLIPIDEMIA, UNSPECIFIED: ICD-10-CM

## 2024-10-16 LAB
ALBUMIN SERPL ELPH-MCNC: 4.9 G/DL
ALP BLD-CCNC: 59 U/L
ALT SERPL-CCNC: 16 U/L
ANION GAP SERPL CALC-SCNC: 13 MMOL/L
AST SERPL-CCNC: 16 U/L
BILIRUB SERPL-MCNC: 0.3 MG/DL
BUN SERPL-MCNC: 16 MG/DL
CALCIUM SERPL-MCNC: 10.2 MG/DL
CHLORIDE SERPL-SCNC: 103 MMOL/L
CHOLEST SERPL-MCNC: 179 MG/DL
CO2 SERPL-SCNC: 26 MMOL/L
CREAT SERPL-MCNC: 0.91 MG/DL
EGFR: 96 ML/MIN/1.73M2
ESTIMATED AVERAGE GLUCOSE: 123 MG/DL
GLUCOSE SERPL-MCNC: 111 MG/DL
HBA1C MFR BLD HPLC: 5.9 %
HDLC SERPL-MCNC: 49 MG/DL
LDLC SERPL CALC-MCNC: 114 MG/DL
NONHDLC SERPL-MCNC: 130 MG/DL
POTASSIUM SERPL-SCNC: 4.7 MMOL/L
PROT SERPL-MCNC: 7.2 G/DL
SODIUM SERPL-SCNC: 141 MMOL/L
TRIGL SERPL-MCNC: 91 MG/DL

## 2024-10-17 ENCOUNTER — APPOINTMENT (OUTPATIENT)
Dept: GASTROENTEROLOGY | Facility: CLINIC | Age: 59
End: 2024-10-17
Payer: COMMERCIAL

## 2024-10-17 VITALS
WEIGHT: 165 LBS | BODY MASS INDEX: 25.9 KG/M2 | HEART RATE: 74 BPM | OXYGEN SATURATION: 95 % | HEIGHT: 67 IN | SYSTOLIC BLOOD PRESSURE: 130 MMHG | RESPIRATION RATE: 16 BRPM | DIASTOLIC BLOOD PRESSURE: 86 MMHG

## 2024-10-17 DIAGNOSIS — K21.9 GASTRO-ESOPHAGEAL REFLUX DISEASE W/OUT ESOPHAGITIS: ICD-10-CM

## 2024-10-17 PROCEDURE — 99204 OFFICE O/P NEW MOD 45 MIN: CPT

## 2024-10-17 RX ORDER — OMEPRAZOLE 20 MG/1
20 TABLET, DELAYED RELEASE ORAL
Qty: 30 | Refills: 2 | Status: ACTIVE | COMMUNITY
Start: 2024-10-17 | End: 1900-01-01

## 2024-11-08 ENCOUNTER — TRANSCRIPTION ENCOUNTER (OUTPATIENT)
Age: 59
End: 2024-11-08

## 2024-11-11 ENCOUNTER — RESULT REVIEW (OUTPATIENT)
Age: 59
End: 2024-11-11

## 2024-11-11 ENCOUNTER — APPOINTMENT (OUTPATIENT)
Dept: HEMATOLOGY ONCOLOGY | Facility: CLINIC | Age: 59
End: 2024-11-11
Payer: COMMERCIAL

## 2024-11-11 VITALS
WEIGHT: 178.25 LBS | HEIGHT: 67 IN | HEART RATE: 69 BPM | TEMPERATURE: 97.3 F | BODY MASS INDEX: 27.98 KG/M2 | DIASTOLIC BLOOD PRESSURE: 97 MMHG | OXYGEN SATURATION: 97 % | RESPIRATION RATE: 16 BRPM | SYSTOLIC BLOOD PRESSURE: 144 MMHG

## 2024-11-11 DIAGNOSIS — Z15.89 GENETIC SUSCEPTIBILITY TO OTHER DISEASE: ICD-10-CM

## 2024-11-11 PROCEDURE — 99214 OFFICE O/P EST MOD 30 MIN: CPT

## 2024-11-14 ENCOUNTER — TRANSCRIPTION ENCOUNTER (OUTPATIENT)
Age: 59
End: 2024-11-14

## 2024-11-22 ENCOUNTER — TRANSCRIPTION ENCOUNTER (OUTPATIENT)
Age: 59
End: 2024-11-22

## 2024-12-10 ENCOUNTER — RX RENEWAL (OUTPATIENT)
Age: 59
End: 2024-12-10

## 2024-12-10 RX ORDER — OMEPRAZOLE 20 MG/1
20 CAPSULE, DELAYED RELEASE ORAL
Qty: 30 | Refills: 2 | Status: ACTIVE | COMMUNITY
Start: 2024-12-10 | End: 1900-01-01

## 2024-12-11 ENCOUNTER — RX RENEWAL (OUTPATIENT)
Age: 59
End: 2024-12-11

## 2025-01-07 ENCOUNTER — NON-APPOINTMENT (OUTPATIENT)
Age: 60
End: 2025-01-07

## 2025-01-07 ENCOUNTER — APPOINTMENT (OUTPATIENT)
Dept: HEART AND VASCULAR | Facility: CLINIC | Age: 60
End: 2025-01-07

## 2025-01-07 VITALS
SYSTOLIC BLOOD PRESSURE: 138 MMHG | HEART RATE: 73 BPM | DIASTOLIC BLOOD PRESSURE: 80 MMHG | WEIGHT: 181 LBS | HEIGHT: 67 IN | BODY MASS INDEX: 28.41 KG/M2 | OXYGEN SATURATION: 96 %

## 2025-01-07 DIAGNOSIS — I48.0 PAROXYSMAL ATRIAL FIBRILLATION: ICD-10-CM

## 2025-01-07 PROCEDURE — 99213 OFFICE O/P EST LOW 20 MIN: CPT | Mod: 25

## 2025-01-07 PROCEDURE — 93000 ELECTROCARDIOGRAM COMPLETE: CPT

## 2025-01-09 ENCOUNTER — APPOINTMENT (OUTPATIENT)
Dept: GASTROENTEROLOGY | Facility: CLINIC | Age: 60
End: 2025-01-09
Payer: COMMERCIAL

## 2025-01-09 VITALS
SYSTOLIC BLOOD PRESSURE: 138 MMHG | WEIGHT: 181 LBS | HEIGHT: 67 IN | DIASTOLIC BLOOD PRESSURE: 94 MMHG | HEART RATE: 67 BPM | OXYGEN SATURATION: 97 % | BODY MASS INDEX: 28.41 KG/M2 | TEMPERATURE: 98 F

## 2025-01-09 DIAGNOSIS — K21.9 GASTRO-ESOPHAGEAL REFLUX DISEASE W/OUT ESOPHAGITIS: ICD-10-CM

## 2025-01-09 DIAGNOSIS — Z12.11 ENCOUNTER FOR SCREENING FOR MALIGNANT NEOPLASM OF COLON: ICD-10-CM

## 2025-01-09 PROCEDURE — 99214 OFFICE O/P EST MOD 30 MIN: CPT

## 2025-01-09 RX ORDER — POLYETHYLENE GLYCOL 3350 17 G/17G
17 POWDER, FOR SOLUTION ORAL
Qty: 1 | Refills: 0 | Status: ACTIVE | COMMUNITY
Start: 2025-01-09 | End: 1900-01-01

## 2025-02-10 ENCOUNTER — APPOINTMENT (OUTPATIENT)
Dept: UROLOGY | Facility: CLINIC | Age: 60
End: 2025-02-10

## 2025-02-11 ENCOUNTER — NON-APPOINTMENT (OUTPATIENT)
Age: 60
End: 2025-02-11

## 2025-02-12 ENCOUNTER — NON-APPOINTMENT (OUTPATIENT)
Age: 60
End: 2025-02-12

## 2025-02-14 ENCOUNTER — TRANSCRIPTION ENCOUNTER (OUTPATIENT)
Age: 60
End: 2025-02-14

## 2025-03-03 ENCOUNTER — APPOINTMENT (OUTPATIENT)
Dept: GASTROENTEROLOGY | Facility: HOSPITAL | Age: 60
End: 2025-03-03

## 2025-03-04 ENCOUNTER — APPOINTMENT (OUTPATIENT)
Dept: HEMATOLOGY ONCOLOGY | Facility: CLINIC | Age: 60
End: 2025-03-04

## 2025-06-16 ENCOUNTER — LABORATORY RESULT (OUTPATIENT)
Age: 60
End: 2025-06-16

## 2025-06-16 ENCOUNTER — APPOINTMENT (OUTPATIENT)
Dept: FAMILY MEDICINE | Facility: CLINIC | Age: 60
End: 2025-06-16
Payer: COMMERCIAL

## 2025-06-16 VITALS
HEIGHT: 67 IN | TEMPERATURE: 97.1 F | DIASTOLIC BLOOD PRESSURE: 86 MMHG | SYSTOLIC BLOOD PRESSURE: 128 MMHG | OXYGEN SATURATION: 97 % | WEIGHT: 164 LBS | BODY MASS INDEX: 25.74 KG/M2 | RESPIRATION RATE: 16 BRPM | HEART RATE: 62 BPM

## 2025-06-16 PROCEDURE — 36415 COLL VENOUS BLD VENIPUNCTURE: CPT

## 2025-06-16 PROCEDURE — 99214 OFFICE O/P EST MOD 30 MIN: CPT

## 2025-06-16 PROCEDURE — G2211 COMPLEX E/M VISIT ADD ON: CPT | Mod: NC

## 2025-06-16 RX ORDER — ESOMEPRAZOLE MAGNESIUM 20 MG/1
20 TABLET ORAL
Refills: 0 | Status: ACTIVE | COMMUNITY

## 2025-06-18 LAB
ALBUMIN SERPL ELPH-MCNC: 5 G/DL
ALP BLD-CCNC: 61 U/L
ALT SERPL-CCNC: 26 U/L
ANION GAP SERPL CALC-SCNC: 16 MMOL/L
AST SERPL-CCNC: 21 U/L
BASOPHILS # BLD AUTO: 0.01 K/UL
BASOPHILS NFR BLD AUTO: 0.2 %
BILIRUB SERPL-MCNC: 0.6 MG/DL
BUN SERPL-MCNC: 13 MG/DL
CALCIUM SERPL-MCNC: 9.3 MG/DL
CHLORIDE SERPL-SCNC: 107 MMOL/L
CHOLEST SERPL-MCNC: 129 MG/DL
CO2 SERPL-SCNC: 22 MMOL/L
CREAT SERPL-MCNC: 0.72 MG/DL
EGFRCR SERPLBLD CKD-EPI 2021: 104 ML/MIN/1.73M2
EOSINOPHIL # BLD AUTO: 0.08 K/UL
EOSINOPHIL NFR BLD AUTO: 2 %
ESTIMATED AVERAGE GLUCOSE: 123 MG/DL
FERRITIN SERPL-MCNC: 157 NG/ML
GLUCOSE SERPL-MCNC: 95 MG/DL
HBA1C MFR BLD HPLC: 5.9 %
HCT VFR BLD CALC: 43.1 %
HDLC SERPL-MCNC: 44 MG/DL
HGB BLD-MCNC: 13.6 G/DL
IMM GRANULOCYTES NFR BLD AUTO: 0.5 %
IRON SATN MFR SERPL: 42 %
IRON SERPL-MCNC: 119 UG/DL
LDLC SERPL-MCNC: 71 MG/DL
LYMPHOCYTES # BLD AUTO: 1.25 K/UL
LYMPHOCYTES NFR BLD AUTO: 30.7 %
MAN DIFF?: NORMAL
MCHC RBC-ENTMCNC: 29 PG
MCHC RBC-ENTMCNC: 31.6 G/DL
MCV RBC AUTO: 91.9 FL
MONOCYTES # BLD AUTO: 0.73 K/UL
MONOCYTES NFR BLD AUTO: 17.9 %
NEUTROPHILS # BLD AUTO: 1.98 K/UL
NEUTROPHILS NFR BLD AUTO: 48.7 %
NONHDLC SERPL-MCNC: 85 MG/DL
PLATELET # BLD AUTO: 191 K/UL
POTASSIUM SERPL-SCNC: 3.9 MMOL/L
PROT SERPL-MCNC: 7.1 G/DL
PSA FREE FLD-MCNC: NORMAL %
PSA FREE SERPL-MCNC: <0.01 NG/ML
PSA SERPL-MCNC: <0.01 NG/ML
RBC # BLD: 4.69 M/UL
RBC # FLD: 14.4 %
SODIUM SERPL-SCNC: 144 MMOL/L
TIBC SERPL-MCNC: 286 UG/DL
TRIGL SERPL-MCNC: 70 MG/DL
UIBC SERPL-MCNC: 167 UG/DL
WBC # FLD AUTO: 4.07 K/UL

## 2025-06-23 ENCOUNTER — APPOINTMENT (OUTPATIENT)
Dept: FAMILY MEDICINE | Facility: CLINIC | Age: 60
End: 2025-06-23

## 2025-07-10 ENCOUNTER — APPOINTMENT (OUTPATIENT)
Dept: GASTROENTEROLOGY | Facility: CLINIC | Age: 60
End: 2025-07-10
Payer: COMMERCIAL

## 2025-07-10 VITALS
WEIGHT: 164 LBS | HEIGHT: 67 IN | OXYGEN SATURATION: 98 % | SYSTOLIC BLOOD PRESSURE: 132 MMHG | BODY MASS INDEX: 25.74 KG/M2 | DIASTOLIC BLOOD PRESSURE: 72 MMHG | HEART RATE: 67 BPM

## 2025-07-10 PROCEDURE — 99213 OFFICE O/P EST LOW 20 MIN: CPT

## 2025-07-10 RX ORDER — SODIUM SULFATE, POTASSIUM SULFATE AND MAGNESIUM SULFATE 1.6; 3.13; 17.5 G/177ML; G/177ML; G/177ML
17.5-3.13-1.6 SOLUTION ORAL
Qty: 2 | Refills: 0 | Status: ACTIVE | COMMUNITY
Start: 2025-07-10 | End: 1900-01-01

## 2025-07-15 ENCOUNTER — TRANSCRIPTION ENCOUNTER (OUTPATIENT)
Age: 60
End: 2025-07-15

## 2025-07-16 ENCOUNTER — TRANSCRIPTION ENCOUNTER (OUTPATIENT)
Age: 60
End: 2025-07-16

## 2025-07-30 ENCOUNTER — RESULT REVIEW (OUTPATIENT)
Age: 60
End: 2025-07-30

## 2025-07-30 ENCOUNTER — APPOINTMENT (OUTPATIENT)
Dept: GASTROENTEROLOGY | Facility: HOSPITAL | Age: 60
End: 2025-07-30

## 2025-07-31 ENCOUNTER — NON-APPOINTMENT (OUTPATIENT)
Age: 60
End: 2025-07-31

## 2025-09-02 ENCOUNTER — APPOINTMENT (OUTPATIENT)
Dept: HEART AND VASCULAR | Facility: CLINIC | Age: 60
End: 2025-09-02
Payer: COMMERCIAL

## 2025-09-02 VITALS
BODY MASS INDEX: 26.37 KG/M2 | WEIGHT: 168 LBS | SYSTOLIC BLOOD PRESSURE: 132 MMHG | HEART RATE: 66 BPM | DIASTOLIC BLOOD PRESSURE: 84 MMHG | HEIGHT: 67 IN | OXYGEN SATURATION: 93 %

## 2025-09-02 DIAGNOSIS — I48.0 PAROXYSMAL ATRIAL FIBRILLATION: ICD-10-CM

## 2025-09-02 PROCEDURE — G2211 COMPLEX E/M VISIT ADD ON: CPT | Mod: NC

## 2025-09-02 PROCEDURE — 99214 OFFICE O/P EST MOD 30 MIN: CPT

## 2025-09-02 PROCEDURE — 93000 ELECTROCARDIOGRAM COMPLETE: CPT

## 2025-09-08 ENCOUNTER — RX RENEWAL (OUTPATIENT)
Age: 60
End: 2025-09-08

## 2025-09-08 ENCOUNTER — APPOINTMENT (OUTPATIENT)
Dept: INTERNAL MEDICINE | Facility: CLINIC | Age: 60
End: 2025-09-08

## 2025-09-11 ENCOUNTER — TRANSCRIPTION ENCOUNTER (OUTPATIENT)
Age: 60
End: 2025-09-11

## (undated) DEVICE — XI ARM NEEDLE DRIVER SUTURECUT MEGA 8MM

## (undated) DEVICE — XI ARM FORCEP FENESTRATED BIPOLAR 8MM

## (undated) DEVICE — BOWL SOL 32OZ LG STRL

## (undated) DEVICE — SOL IRR POUR H2O 250ML

## (undated) DEVICE — NDL MAX CORE 18G X 25CM

## (undated) DEVICE — PACK GENERAL LAPAROSCOPY

## (undated) DEVICE — PLASTIC SOLUTION BOWL 160Z

## (undated) DEVICE — TIP METZENBAUM SCISSOR MONOPOLAR ENDOCUT (ORANGE)

## (undated) DEVICE — TUBING AIRSEAL TRI-LUMEN FILTERED

## (undated) DEVICE — DRAPE SPLIT SHEET 77" X 120"

## (undated) DEVICE — SUT VICRYL PLUS 2-0 27" SH UNDYED

## (undated) DEVICE — DRAPE MEDIUM SHEET 44" X 70"

## (undated) DEVICE — GOWN ROYAL SILK XL

## (undated) DEVICE — XI DRAPE COLUMN

## (undated) DEVICE — XI SEAL UNIV 5- 8 MM

## (undated) DEVICE — SUT VLOC 180 2-0 9" GS-22 GREEN

## (undated) DEVICE — SUT PASSER SYMMETRY OR PRO PUNCTURE CLOSURE DEVICE

## (undated) DEVICE — TROCAR APPLIED MEDICAL KII BALLOON BLUNT TIP 12MM X 100MM

## (undated) DEVICE — FOLEY CATH 2-WAY 18FR 5CC LATEX HYDROGEL

## (undated) DEVICE — SYR CATH TIP 2 OZ

## (undated) DEVICE — D HELP - CLEARVIEW CLEARIFY SYSTEM

## (undated) DEVICE — FOLEY HOLDER STATLOCK 2 WAY ADULT

## (undated) DEVICE — Device

## (undated) DEVICE — DRSG TEGADERM 1.75X1.75"

## (undated) DEVICE — ENDOCATCH 10MM SPECIMEN POUCH

## (undated) DEVICE — DRAINAGE BAG URINARY 2L

## (undated) DEVICE — XI ARM FORCEP PROGRASP 8MM

## (undated) DEVICE — SUT MONOCRYL PLUS 4-0 18" PS-2 UNDYED

## (undated) DEVICE — XI OBTURATOR OPTICAL BLADELESS 8MM

## (undated) DEVICE — NDL BIOPSY CHIBA 22G X 20CM

## (undated) DEVICE — TUBING ROSI REMOTE VTI

## (undated) DEVICE — FOLEY CATH 3-WAY 24FR 30CC SOFT SIMPLASTIC

## (undated) DEVICE — XI ARM FORCEP MARYLAND BIPOLAR

## (undated) DEVICE — SUT VICRYL 2-0 27" SH

## (undated) DEVICE — LUBRICANT INST ELECTROLUBE Z SOLUTION

## (undated) DEVICE — XI CORD MONOPOLAR CAUTERY (GREEN)

## (undated) DEVICE — WARMING BLANKET UPPER ADULT

## (undated) DEVICE — NDL HYPO REGULAR BEVEL 25G X 1.5" (BLUE)

## (undated) DEVICE — XI 12MM AND STAPLER CANNULA SEAL

## (undated) DEVICE — DRAPE TOWEL BLUE 17" X 24"

## (undated) DEVICE — PREP CHLORAPREP HI-LITE ORANGE 26ML

## (undated) DEVICE — XI ARM NEEDLE DRIVER LARGE

## (undated) DEVICE — SUT VICRYL 0 54" TIES

## (undated) DEVICE — GOWN XL LEVEL 3

## (undated) DEVICE — SYR LUER LOK 10CC

## (undated) DEVICE — XI DRAPE ARM

## (undated) DEVICE — SLV COMPRESSION KNEE MED

## (undated) DEVICE — SUT CLIP LAPRA-TY ABSORBABLE SIZE 0.118 TO 0.12" VIOLET

## (undated) DEVICE — POSITIONER PATIENT SAFETY STRAP 3X60"

## (undated) DEVICE — XI ARM SCISSOR MONO CURVED

## (undated) DEVICE — MARKING PEN W RULER / LABELS

## (undated) DEVICE — INSUFFLATION NDL ETHICON ENDOPATH PNEUMOPARITONEUM 120MM

## (undated) DEVICE — BLADE SURGICAL #15 CARBON

## (undated) DEVICE — BALLOON ENDOCAVITY 2X14CM

## (undated) DEVICE — SUT STRATAFIX SPIRAL MONOCRYL PLUS 2-0 20CM SH UNDYED

## (undated) DEVICE — SYR LUER LOK 50CC

## (undated) DEVICE — SUT MONOCRYL 4-0 27" PS-2 UNDYED

## (undated) DEVICE — TROCAR SURGIQUEST AIRSEAL 8MMX100MM

## (undated) DEVICE — ELCTR BOVIE PENCIL HANDPIECE

## (undated) DEVICE — FOLEY CATH 2-WAY 20FR 5CC UNCOATED SILICONE

## (undated) DEVICE — TROCAR COVIDIEN VERSAPORT BLADELESS OPTICAL 5MM STANDARD

## (undated) DEVICE — DRAIN JACKSON PRATT 10MM FLAT 3/4 NO TROCAR

## (undated) DEVICE — GLV 8 PROTEXIS (WHITE)

## (undated) DEVICE — SUT VICRYL PLUS 0 27" UR-6

## (undated) DEVICE — POSITIONER PINK PAD PIGAZZI SYSTEM XL W ARM PROTECTOR

## (undated) DEVICE — GLV 7.5 PROTEXIS (WHITE)

## (undated) DEVICE — XI ENDOWRIST 12 - 8 MM CANNULA REDUCER

## (undated) DEVICE — DRAIN RESERVOIR FOR JACKSON PRATT 100CC CARDINAL

## (undated) DEVICE — DRSG TELFA 3 X 8

## (undated) DEVICE — DRAPE 3/4 SHEET 52X76"

## (undated) DEVICE — ELCTR GROUNDING PAD ADULT COVIDIEN

## (undated) DEVICE — XI ARM CLIP APPLIER LARGE

## (undated) DEVICE — DRSG DERMABOND 0.7ML

## (undated) DEVICE — DRSG STERISTRIPS 0.5 X 4"

## (undated) DEVICE — SUT VLOC 180 3-0 6" V-20 GREEN

## (undated) DEVICE — INSUFFLATION NDL COVIDIEN SURGINEEDLE VERESS 120MM

## (undated) DEVICE — SUT VICRYL PLUS 3-0 27" SH UNDYED

## (undated) DEVICE — SUT VICRYL 0 27" UR-6

## (undated) DEVICE — GRID BRACHYTHERAPY EZ 18G

## (undated) DEVICE — BLADE SURGICAL #11 CARBON

## (undated) DEVICE — XI TIP COVER

## (undated) DEVICE — PLUMEPORT LAPAROSCOPIC SMOKE FILTRATION DEVICE